# Patient Record
Sex: FEMALE | NOT HISPANIC OR LATINO | Employment: FULL TIME | URBAN - NONMETROPOLITAN AREA
[De-identification: names, ages, dates, MRNs, and addresses within clinical notes are randomized per-mention and may not be internally consistent; named-entity substitution may affect disease eponyms.]

---

## 2017-02-10 ENCOUNTER — HOSPITAL ENCOUNTER (OUTPATIENT)
Dept: LAB | Facility: MEDICAL CENTER | Age: 52
End: 2017-02-10
Attending: NURSE PRACTITIONER
Payer: COMMERCIAL

## 2017-02-10 DIAGNOSIS — E78.2 MIXED HYPERLIPIDEMIA: ICD-10-CM

## 2017-02-10 DIAGNOSIS — I10 ESSENTIAL HYPERTENSION, BENIGN: ICD-10-CM

## 2017-02-10 DIAGNOSIS — R53.83 FATIGUE, UNSPECIFIED TYPE: ICD-10-CM

## 2017-02-10 LAB
25(OH)D3 SERPL-MCNC: 33 NG/ML (ref 30–100)
ALBUMIN SERPL BCP-MCNC: 4.1 G/DL (ref 3.2–4.9)
ALBUMIN/GLOB SERPL: 1.5 G/DL
ALP SERPL-CCNC: 73 U/L (ref 30–99)
ALT SERPL-CCNC: 15 U/L (ref 2–50)
ANION GAP SERPL CALC-SCNC: 5 MMOL/L (ref 0–11.9)
AST SERPL-CCNC: 19 U/L (ref 12–45)
BILIRUB SERPL-MCNC: 0.4 MG/DL (ref 0.1–1.5)
BUN SERPL-MCNC: 22 MG/DL (ref 8–22)
CALCIUM SERPL-MCNC: 9.8 MG/DL (ref 8.5–10.5)
CHLORIDE SERPL-SCNC: 109 MMOL/L (ref 96–112)
CHOLEST SERPL-MCNC: 248 MG/DL (ref 100–199)
CO2 SERPL-SCNC: 28 MMOL/L (ref 20–33)
CREAT SERPL-MCNC: 0.99 MG/DL (ref 0.5–1.4)
ERYTHROCYTE [DISTWIDTH] IN BLOOD BY AUTOMATED COUNT: 44.4 FL (ref 35.9–50)
GLOBULIN SER CALC-MCNC: 2.8 G/DL (ref 1.9–3.5)
GLUCOSE SERPL-MCNC: 102 MG/DL (ref 65–99)
HCT VFR BLD AUTO: 43.5 % (ref 37–47)
HDLC SERPL-MCNC: 61 MG/DL
HGB BLD-MCNC: 14.2 G/DL (ref 12–16)
LDLC SERPL CALC-MCNC: 165 MG/DL
MCH RBC QN AUTO: 30.3 PG (ref 27–33)
MCHC RBC AUTO-ENTMCNC: 32.6 G/DL (ref 33.6–35)
MCV RBC AUTO: 92.9 FL (ref 81.4–97.8)
PLATELET # BLD AUTO: 226 K/UL (ref 164–446)
PMV BLD AUTO: 9.8 FL (ref 9–12.9)
POTASSIUM SERPL-SCNC: 4.7 MMOL/L (ref 3.6–5.5)
PROT SERPL-MCNC: 6.9 G/DL (ref 6–8.2)
RBC # BLD AUTO: 4.68 M/UL (ref 4.2–5.4)
SODIUM SERPL-SCNC: 142 MMOL/L (ref 135–145)
TRIGL SERPL-MCNC: 110 MG/DL (ref 0–149)
WBC # BLD AUTO: 7.2 K/UL (ref 4.8–10.8)

## 2017-02-10 PROCEDURE — 82306 VITAMIN D 25 HYDROXY: CPT

## 2017-02-10 PROCEDURE — 36415 COLL VENOUS BLD VENIPUNCTURE: CPT

## 2017-02-10 PROCEDURE — 80053 COMPREHEN METABOLIC PANEL: CPT

## 2017-02-10 PROCEDURE — 80061 LIPID PANEL: CPT

## 2017-02-10 PROCEDURE — 85027 COMPLETE CBC AUTOMATED: CPT

## 2017-02-15 ENCOUNTER — TELEPHONE (OUTPATIENT)
Dept: MEDICAL GROUP | Facility: PHYSICIAN GROUP | Age: 52
End: 2017-02-15

## 2017-02-15 DIAGNOSIS — E78.2 MIXED HYPERLIPIDEMIA: ICD-10-CM

## 2017-02-15 RX ORDER — SIMVASTATIN 20 MG
20 TABLET ORAL EVERY EVENING
Qty: 30 TAB | Refills: 11 | Status: SHIPPED | OUTPATIENT
Start: 2017-02-15 | End: 2018-03-13 | Stop reason: SDUPTHER

## 2017-02-16 NOTE — TELEPHONE ENCOUNTER
----- Message from LYDIA Martinez sent at 2/13/2017 10:29 AM PST -----  Please call the patient and let her know that she will need to watch her diet a little more closely, recommend limiting red meat, cheese, butter, fried food. Exercising 30+ minutes a day most days of the week. She can read more about this at [employer].org  She needs to take her simvastatin daily, this is on the 4$ list at [employer].

## 2017-02-16 NOTE — TELEPHONE ENCOUNTER
I spoke with pt.  She was advised on note below/.  She said thank you.  She also states that she needs a script for the simvastatin to be sent to [facility].  Also she told me that the blood pressure sent high after cutting back on her metoprolol.  She went back to two pills and she states her bp went normal again.

## 2017-08-09 ENCOUNTER — NON-PROVIDER VISIT (OUTPATIENT)
Dept: OCCUPATIONAL MEDICINE | Facility: CLINIC | Age: 52
End: 2017-08-09
Payer: COMMERCIAL

## 2017-08-09 DIAGNOSIS — Z02.1 DRUG TESTING, PRE-EMPLOYMENT: ICD-10-CM

## 2017-08-09 PROCEDURE — 8898 PR URINE 11 PANEL - SEND TO LAB: Performed by: PREVENTIVE MEDICINE

## 2017-10-14 ENCOUNTER — OFFICE VISIT (OUTPATIENT)
Dept: URGENT CARE | Facility: PHYSICIAN GROUP | Age: 52
End: 2017-10-14
Payer: COMMERCIAL

## 2017-10-14 ENCOUNTER — HOSPITAL ENCOUNTER (OUTPATIENT)
Facility: MEDICAL CENTER | Age: 52
End: 2017-10-14
Attending: FAMILY MEDICINE
Payer: COMMERCIAL

## 2017-10-14 VITALS
WEIGHT: 161 LBS | DIASTOLIC BLOOD PRESSURE: 62 MMHG | SYSTOLIC BLOOD PRESSURE: 116 MMHG | TEMPERATURE: 99.1 F | HEART RATE: 86 BPM | HEIGHT: 62 IN | RESPIRATION RATE: 16 BRPM | BODY MASS INDEX: 29.63 KG/M2 | OXYGEN SATURATION: 98 %

## 2017-10-14 DIAGNOSIS — N10 ACUTE PYELONEPHRITIS: ICD-10-CM

## 2017-10-14 DIAGNOSIS — R53.1 WEAKNESS: ICD-10-CM

## 2017-10-14 DIAGNOSIS — Z87.440 HISTORY OF UTI: ICD-10-CM

## 2017-10-14 LAB
APPEARANCE UR: CLEAR
BILIRUB UR STRIP-MCNC: NORMAL MG/DL
COLOR UR AUTO: NORMAL
GLUCOSE UR STRIP.AUTO-MCNC: NORMAL MG/DL
KETONES UR STRIP.AUTO-MCNC: NORMAL MG/DL
LEUKOCYTE ESTERASE UR QL STRIP.AUTO: NORMAL
NITRITE UR QL STRIP.AUTO: NORMAL
PH UR STRIP.AUTO: 7 [PH] (ref 5–8)
PROT UR QL STRIP: 100 MG/DL
RBC UR QL AUTO: NORMAL
SP GR UR STRIP.AUTO: 1.01
UROBILINOGEN UR STRIP-MCNC: 4 MG/DL

## 2017-10-14 PROCEDURE — 81002 URINALYSIS NONAUTO W/O SCOPE: CPT | Performed by: FAMILY MEDICINE

## 2017-10-14 PROCEDURE — 87086 URINE CULTURE/COLONY COUNT: CPT

## 2017-10-14 PROCEDURE — 99214 OFFICE O/P EST MOD 30 MIN: CPT | Mod: 25 | Performed by: FAMILY MEDICINE

## 2017-10-14 RX ORDER — CIPROFLOXACIN 500 MG/1
TABLET, FILM COATED ORAL
Qty: 14 TAB | Refills: 0 | Status: SHIPPED | OUTPATIENT
Start: 2017-10-14 | End: 2018-03-13

## 2017-10-14 RX ORDER — CEFTRIAXONE 1 G/1
1 INJECTION, POWDER, FOR SOLUTION INTRAMUSCULAR; INTRAVENOUS ONCE
Status: COMPLETED | OUTPATIENT
Start: 2017-10-14 | End: 2017-10-14

## 2017-10-14 RX ADMIN — CEFTRIAXONE 1 G: 1 INJECTION, POWDER, FOR SOLUTION INTRAMUSCULAR; INTRAVENOUS at 14:52

## 2017-10-14 NOTE — PROGRESS NOTES
Chief Complaint:    Chief Complaint   Patient presents with   • Weakness     nausea, seen in ER 10/8/17 Dx with letitia infectiondr dorsey       History of Present Illness:    This is a new problem. She was seen in [place] ER on 10/8/17 and was treated for kidney infection with Ceftriaxone 1 gram and rx'd Bactrim DS BID x 10 days which she has been taking. She reports she had weakness, pain in right back, right flank, and abdomen. She had nausea. These symptoms have improved. She felt much better within 3 days after going to ER, but now she feels she is getting worse. Feeling more weak. Still with nausea, but not severe enough to need medication for it.      Review of Systems:    Constitutional: See HPI.  Eyes: Negative for change in vision, photophobia, pain, redness, and discharge.  ENT: Negative for ear pain, ear discharge, hearing loss, tinnitus, nasal congestion, nosebleeds, and sore throat.    Respiratory: Negative for cough, hemoptysis, sputum production, shortness of breath, wheezing, and stridor.    Cardiovascular: Negative for chest pain, palpitations, orthopnea, claudication, leg swelling, and PND.   Gastrointestinal: See HPI.  Genitourinary: See HPI.  Musculoskeletal: See HPI.  Skin: Negative for rash and itching.   Neurological: Negative for dizziness, tingling, tremors, sensory change, speech change, focal weakness, seizures, loss of consciousness, and headaches.   Endo: Negative for polydipsia.   Heme: Does not bruise/bleed easily.   Psychiatric/Behavioral: No new symptoms.    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     Social History Main Topics   • Smoking status: Current Every Day Smoker     Packs/day: 1.00     Years: 38.00     Types: Cigarettes   • Smokeless tobacco: Never Used   • Alcohol use No   • Drug use: No   • Sexual activity: Yes     Partners: Male     Birth control/ protection: Post-Menopausal     Other Topics Concern   • Exercise No     does walk a great deal at work     Social History Narrative   • No narrative on file       Family History:    Family History   Problem Relation Age of Onset   • Hypertension Father    • Heart Disease Father        Medications:    Current Outpatient Prescriptions on File Prior to Visit   Medication Sig Dispense Refill   • simvastatin (ZOCOR) 20 MG Tab Take 1 Tab by mouth every evening. 30 Tab 11   • escitalopram (LEXAPRO) 10 MG Tab Take 1 Tab by mouth every day. 90 Tab 3   • metoprolol SR (TOPROL XL) 100 MG TABLET SR 24 HR Take 1 Tab by mouth 2 Times a Day. 180 Tab 3   • albuterol 108 (90 BASE) MCG/ACT Aero Soln inhalation aerosol Inhale 2 Puffs by mouth every four hours as needed for Shortness of Breath. 1 Inhaler 1   • aspirin EC (ECOTRIN) 81 MG Tablet Delayed Response Take 81 mg by mouth every day.       No current facility-administered medications on file prior to visit.        Allergies:    No Known Allergies      Vitals:    Vitals:    10/14/17 1409   BP: 116/62   Pulse: 86   Resp: 16   Temp: 37.3 °C (99.1 °F)   SpO2: 98%   Weight: 73 kg (161 lb)   Height: 1.575 m (5' 2.01\")       Physical Exam:    Constitutional: Vital signs reviewed. Appears well-developed and well-nourished. No acute distress.   Eyes: Sclera white, conjunctivae clear. " PERRLA.  ENT: External ears normal. Hearing normal.   Neck: Neck supple.   Cardiovascular: Regular rate and rhythm. No murmur.  Pulmonary/Chest: Respirations non-labored. Clear to auscultation bilaterally.  Abdomen: Bowel sounds are normal active. Soft, non-distended, and non-tender to palpation.  Musculoskeletal: Normal gait. Normal range of motion. No muscular atrophy or weakness.  Neurological: Alert and oriented to person, place, and time. CN 2-12 intact. Muscle tone normal. Coordination normal.   Skin: No rashes or lesions. Warm, dry, normal turgor.  Psychiatric: Normal mood and affect. Behavior is normal. Judgment and thought content normal.     Diagnostics:    POCT URINALYSIS (Order #[id number]) on 10/14/17   Component Results     Component Value Ref Range & Units Status   POC Color narayanan Negative Final   POC Appearance clear Negative Final   POC Leukocyte Esterase sm Negative Final   POC Nitrites neg Negative Final   POC Urobiligen 4 Negative (0.2) mg/dL Final   POC Protein 100 Negative mg/dL Final   POC Urine PH 7.0 5.0 - 8.0 Final   POC Blood mod Negative Final   POC Specific Gravity 1.015 <1.005 - >1.030 Final   POC Ketones neg Negative mg/dL Final   POC Biliruben sm Negative mg/dL Final   POC Glucose neg Negative mg/dL Final   Last Resulted Time   Sat Oct 14, 2017  2:33 PM       Assessment / Plan:    1. Weakness  - POCT Urinalysis  - URINE CULTURE(NEW); Future    2. History of UTI  - POCT Urinalysis  - URINE CULTURE(NEW); Future    3. Acute pyelonephritis  - URINE CULTURE(NEW); Future  - cefTRIAXone (ROCEPHIN) injection 1 g; 1,000 mg by Intramuscular route Once.  - ciprofloxacin (CIPRO) 500 MG Tab; 1 TAB TWICE A DAY X 7 DAYS.  Dispense: 14 Tab; Refill: 0      Work note given - excuse for 10/11 thru and including 10/14/17.    Discussed with her DDX and management options.    I suspect the Ceftriaxone she received in ER on 10/8/17 helped and is the reason she improved for about 3 days after being seen.  However, her weakness has been worsening and U. dip today still shows signs of infection, which if Bactrim DS rx'd by ER was working, I think the U. dip would be mostly clear by now after being on med since 10/8/17. Thus, I suspect the Bactrim DS is not working.    Due to this, I offered another Rocephin IM treatment and switch to other p.o. antibiotic. She is agreeable.    Agreeable to medications given and prescribed and send urine for culture. She will d/c Bactrim DS rx'd by ER on 10/8/17.    Says may call [phone] (M) with urine culture result and OK to leave message with result.    Follow-up with PCP or urgent care if getting worse while waiting for urine culture result.

## 2017-10-16 DIAGNOSIS — N10 ACUTE PYELONEPHRITIS: ICD-10-CM

## 2017-10-16 DIAGNOSIS — Z87.440 HISTORY OF UTI: ICD-10-CM

## 2017-10-16 DIAGNOSIS — R53.1 WEAKNESS: ICD-10-CM

## 2017-10-18 LAB
BACTERIA UR CULT: NORMAL
SIGNIFICANT IND 70042: NORMAL
SOURCE SOURCE: NORMAL

## 2018-02-15 DIAGNOSIS — F32.1 MODERATE SINGLE CURRENT EPISODE OF MAJOR DEPRESSIVE DISORDER (HCC): ICD-10-CM

## 2018-02-15 DIAGNOSIS — I10 ESSENTIAL HYPERTENSION, BENIGN: ICD-10-CM

## 2018-02-15 RX ORDER — ESCITALOPRAM OXALATE 10 MG/1
TABLET ORAL
Refills: 3 | OUTPATIENT
Start: 2018-02-15

## 2018-02-15 RX ORDER — METOPROLOL SUCCINATE 100 MG/1
TABLET, EXTENDED RELEASE ORAL
Refills: 11 | OUTPATIENT
Start: 2018-02-15

## 2018-02-19 DIAGNOSIS — F32.1 MODERATE SINGLE CURRENT EPISODE OF MAJOR DEPRESSIVE DISORDER (HCC): ICD-10-CM

## 2018-02-20 DIAGNOSIS — I10 ESSENTIAL HYPERTENSION, BENIGN: ICD-10-CM

## 2018-02-20 NOTE — TELEPHONE ENCOUNTER
Was the patient seen in the last year in this department? No     Does patient have an active prescription for medications requested? No     Received Request Via: Patient     Ov made for 3/16/201/8  Pt states that she has been out for 2 months she is requesting to have a refill till her appointment

## 2018-02-21 RX ORDER — ESCITALOPRAM OXALATE 10 MG/1
10 TABLET ORAL DAILY
Qty: 90 TAB | Refills: 3 | Status: SHIPPED | OUTPATIENT
Start: 2018-02-21 | End: 2019-06-18 | Stop reason: SDUPTHER

## 2018-02-22 RX ORDER — METOPROLOL SUCCINATE 100 MG/1
TABLET, EXTENDED RELEASE ORAL
Qty: 60 TAB | Refills: 0 | Status: SHIPPED | OUTPATIENT
Start: 2018-02-22 | End: 2018-03-13 | Stop reason: SDUPTHER

## 2018-03-13 ENCOUNTER — OFFICE VISIT (OUTPATIENT)
Dept: MEDICAL GROUP | Facility: PHYSICIAN GROUP | Age: 53
End: 2018-03-13
Payer: COMMERCIAL

## 2018-03-13 VITALS
OXYGEN SATURATION: 97 % | HEIGHT: 62 IN | BODY MASS INDEX: 33.84 KG/M2 | SYSTOLIC BLOOD PRESSURE: 116 MMHG | RESPIRATION RATE: 16 BRPM | HEART RATE: 62 BPM | TEMPERATURE: 98.2 F | DIASTOLIC BLOOD PRESSURE: 62 MMHG | WEIGHT: 183.9 LBS

## 2018-03-13 DIAGNOSIS — E78.2 MIXED HYPERLIPIDEMIA: ICD-10-CM

## 2018-03-13 DIAGNOSIS — Z78.0 POSTMENOPAUSAL: ICD-10-CM

## 2018-03-13 DIAGNOSIS — Z23 NEED FOR VACCINATION: ICD-10-CM

## 2018-03-13 DIAGNOSIS — R51.9 FREQUENT HEADACHES: ICD-10-CM

## 2018-03-13 DIAGNOSIS — F17.200 TOBACCO USE DISORDER: Chronic | ICD-10-CM

## 2018-03-13 DIAGNOSIS — E66.9 OBESITY (BMI 30-39.9): ICD-10-CM

## 2018-03-13 DIAGNOSIS — F32.1 MODERATE SINGLE CURRENT EPISODE OF MAJOR DEPRESSIVE DISORDER (HCC): ICD-10-CM

## 2018-03-13 DIAGNOSIS — Z12.31 ENCOUNTER FOR SCREENING MAMMOGRAM FOR BREAST CANCER: ICD-10-CM

## 2018-03-13 DIAGNOSIS — E55.9 VITAMIN D DEFICIENCY: ICD-10-CM

## 2018-03-13 DIAGNOSIS — Z00.00 HEALTH CARE MAINTENANCE: ICD-10-CM

## 2018-03-13 DIAGNOSIS — I10 ESSENTIAL HYPERTENSION, BENIGN: ICD-10-CM

## 2018-03-13 PROCEDURE — 90715 TDAP VACCINE 7 YRS/> IM: CPT | Performed by: NURSE PRACTITIONER

## 2018-03-13 PROCEDURE — 90471 IMMUNIZATION ADMIN: CPT | Performed by: NURSE PRACTITIONER

## 2018-03-13 PROCEDURE — 99214 OFFICE O/P EST MOD 30 MIN: CPT | Mod: 25 | Performed by: NURSE PRACTITIONER

## 2018-03-13 RX ORDER — METOPROLOL SUCCINATE 100 MG/1
TABLET, EXTENDED RELEASE ORAL
Qty: 60 TAB | Refills: 5 | Status: SHIPPED | OUTPATIENT
Start: 2018-03-13 | End: 2018-12-13 | Stop reason: SDUPTHER

## 2018-03-13 RX ORDER — SIMVASTATIN 20 MG
20 TABLET ORAL EVERY EVENING
Qty: 30 TAB | Refills: 11 | Status: SHIPPED | OUTPATIENT
Start: 2018-03-13 | End: 2018-06-25

## 2018-03-13 NOTE — ASSESSMENT & PLAN NOTE
Patient is a 52-year-old female with hypertension, she continues on metoprolol 200 mg sustained-release daily. Her blood pressure is normal. She denies chest pain, shortness of breath, change of vision, headaches. She is due for CMP. We will have her follow-up in 6 months.

## 2018-03-13 NOTE — ASSESSMENT & PLAN NOTE
Patient's is a 52-year-old female with hyperlipidemia, she continues on simvastatin 20 mg daily, she is due for labs.

## 2018-03-13 NOTE — PROGRESS NOTES
[facility]  Primary Care Office Visit - Problem-Oriented        History:     Bryan Kearney is a 52 y.o. female who is here today to discuss Hypertension (Medication refills of metoprolol)      Health care maintenance  Due for PAP & mammogram - ordered    Essential hypertension, benign  Patient is a 52-year-old female with hypertension, she continues on metoprolol 200 mg sustained-release daily. Her blood pressure is normal. She denies chest pain, shortness of breath, change of vision, headaches. She is due for CMP. We will have her follow-up in 6 months.    Major depression, single episode  Patient is a 52-year-old female with depression here for follow-up. She continues on citalopram 10 mg daily. She has less tearfulness and sadness. She does occasionally have irritability though this is mostly directed towards her . She does admit that if she does not have her ask citalopram she has extreme mood swings. She denies any side effects of this medication. She denies suicidal or homicidal ideations. She will follow up in 6 months.    Mixed hyperlipidemia  Patient's is a 52-year-old female with hyperlipidemia, she continues on simvastatin 20 mg daily, she is due for labs.    Tobacco use disorder  Patient is a daily smoker, she has no intentions of quitting.        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     Social History Main Topics   • Smoking status: Current Every Day Smoker     Packs/day: 1.00     Years: 38.00     Types: Cigarettes   • Smokeless tobacco: Never Used   • Alcohol use No   • Drug use: No   • Sexual activity: Yes     Partners: Male     Birth control/ protection: Post-Menopausal     Other Topics Concern   • Exercise No     does walk a great deal at work     Social History Narrative   • No narrative on file     History   Smoking Status   • Current Every Day Smoker   • Packs/day: 1.00   • Years: 38.00   • Types: Cigarettes   Smokeless Tobacco   • Never Used     Family History   Problem Relation Age of Onset   • Hypertension Father    • Heart Disease Father      No Known Allergies    Problem List:     Patient Active Problem List    Diagnosis Date Noted   • Health care maintenance 03/13/2018   • Obesity (BMI 30-39.9) 03/13/2018   • Postmenopausal 03/13/2018   • Major depression, single episode 10/04/2013   • Essential hypertension, benign 02/26/2013   • Vitamin D deficiency 05/23/2011   • Tobacco use disorder 02/01/2010   • Mixed hyperlipidemia          Medications:     Current Outpatient Prescriptions:   •  metoprolol SR (TOPROL XL) 100 MG TABLET SR 24 HR, TAKE 2 TABLET BY MOUTH ONCE DAILY, Disp: 60 Tab, Rfl: 5  •  simvastatin (ZOCOR) 20 MG Tab, Take 1 Tab by mouth every evening., Disp: 30 Tab, Rfl: 11  •  escitalopram (LEXAPRO) 10 MG Tab, Take 1 Tab by mouth every day., Disp: 90 Tab, Rfl: 3  •  albuterol 108 (90 BASE) MCG/ACT Aero Soln inhalation aerosol, Inhale 2 Puffs by mouth every four hours as needed for Shortness of Breath., Disp: 1 Inhaler, Rfl: 1  •  aspirin EC (ECOTRIN) 81 MG Tablet Delayed Response, Take 81 mg by mouth every day., Disp: , Rfl:       Review of Systems:     Pertinent positives as per HPI, all other systems reviewed and WNL   Physical Assessment:     VS: /62   Pulse 62   Temp 36.8 °C (98.2 °F)   Resp 16   Ht 1.575 m (5' 2.01\")  "  Wt 83.4 kg (183 lb 14.4 oz)   SpO2 97%   Breastfeeding? No   BMI 33.63 kg/m²     General: Well-developed, well-nourished ,female, obese     Head: PERRL, EOMI. Normocephalic. No facial asymmetry noted.  Cardiovasc:RRR, no MRG. No thrills or bruits. Pulses 2+ and symmetric at all distal extremities.  Pulmonary: Lungs clear bilaterally.  Normal respiratory effort. No wheeze or crackles.   Neuro: Alert and oriented  Skin:No rashes noted. Skin warm, dry, intact    Psych: Dressed appropriately for the weather, pleasant and conversant.  Affect, mood & judgment appropriate.    Assessment/Plan:   Bryan was seen today for hypertension.    Diagnoses and all orders for this visit:    Essential hypertension, benign, chronic, stable   - Continue current treatment, monitor CMP, follow up in 6 months, sooner if needed  -     metoprolol SR (TOPROL XL) 100 MG TABLET SR 24 HR; TAKE 2 TABLET BY MOUTH ONCE DAILY  -     COMP METABOLIC PANEL; Future    Moderate single current episode of major depressive disorder (CMS-HCC), chronic, stable  - . Continue Lexapro, follow-up in 6 months, sooner if needed.    Frequent headaches, chronic, stable on present treatment  -     metoprolol SR (TOPROL XL) 100 MG TABLET SR 24 HR; TAKE 2 TABLET BY MOUTH ONCE DAILY    Need for vaccination  -     TDAP VACCINE =>6YO IM    Mixed hyperlipidemia, chronic, unclear control, monitor labs and follow up in 6 months  -     simvastatin (ZOCOR) 20 MG Tab; Take 1 Tab by mouth every evening.  -     LIPID PROFILE; Future    Health care maintenance  - Mammogram ordered, patient to follow-up in the office for Pap smear    Obesity (BMI 30-39.9)  -     Patient identified as having weight management issue.  Appropriate orders and counseling given.    Encounter for screening mammogram for breast cancer  -     MA-SCREEN MAMMO W/CAD-BILAT; Future\    Tobacco use disorder, not ready to quit    Vitamin D deficiency, unclear control, monitor labs  -     VITAMIN D,25  HYDROXY; Future      Patient is agreeable to the above plan and voiced understanding. All questions answered.     Please note that this dictation was created using voice recognition software. I have made every reasonable attempt to correct obvious errors, but I expect that there are errors of grammar and possibly content that I did not discover before finalizing the note.      NICA South  3/13/2018, 3:22 PM

## 2018-03-13 NOTE — ASSESSMENT & PLAN NOTE
Patient is a 52-year-old female with depression here for follow-up. She continues on citalopram 10 mg daily. She has less tearfulness and sadness. She does occasionally have irritability though this is mostly directed towards her . She does admit that if she does not have her ask citalopram she has extreme mood swings. She denies any side effects of this medication. She denies suicidal or homicidal ideations. She will follow up in 6 months.

## 2018-06-23 DIAGNOSIS — E78.2 MIXED HYPERLIPIDEMIA: ICD-10-CM

## 2018-06-25 RX ORDER — SIMVASTATIN 20 MG
TABLET ORAL
Qty: 90 TAB | Refills: 0 | Status: SHIPPED | OUTPATIENT
Start: 2018-06-25 | End: 2019-06-24

## 2018-11-14 ENCOUNTER — HOSPITAL ENCOUNTER (OUTPATIENT)
Facility: MEDICAL CENTER | Age: 53
End: 2018-11-14
Attending: PHYSICIAN ASSISTANT
Payer: COMMERCIAL

## 2018-11-14 ENCOUNTER — OFFICE VISIT (OUTPATIENT)
Dept: URGENT CARE | Facility: PHYSICIAN GROUP | Age: 53
End: 2018-11-14
Payer: COMMERCIAL

## 2018-11-14 VITALS
HEIGHT: 62 IN | WEIGHT: 179 LBS | HEART RATE: 76 BPM | RESPIRATION RATE: 16 BRPM | DIASTOLIC BLOOD PRESSURE: 82 MMHG | OXYGEN SATURATION: 94 % | BODY MASS INDEX: 32.94 KG/M2 | SYSTOLIC BLOOD PRESSURE: 132 MMHG | TEMPERATURE: 96.8 F

## 2018-11-14 DIAGNOSIS — N30.00 ACUTE CYSTITIS WITHOUT HEMATURIA: ICD-10-CM

## 2018-11-14 DIAGNOSIS — N30.00 ACUTE CYSTITIS WITHOUT HEMATURIA: Primary | ICD-10-CM

## 2018-11-14 DIAGNOSIS — R06.2 WHEEZE: ICD-10-CM

## 2018-11-14 DIAGNOSIS — R30.0 DYSURIA: ICD-10-CM

## 2018-11-14 DIAGNOSIS — R10.9 FLANK PAIN: ICD-10-CM

## 2018-11-14 DIAGNOSIS — J00 ACUTE NASOPHARYNGITIS: ICD-10-CM

## 2018-11-14 LAB
APPEARANCE UR: NORMAL
BILIRUB UR STRIP-MCNC: NORMAL MG/DL
COLOR UR AUTO: NORMAL
GLUCOSE UR STRIP.AUTO-MCNC: NORMAL MG/DL
KETONES UR STRIP.AUTO-MCNC: NORMAL MG/DL
LEUKOCYTE ESTERASE UR QL STRIP.AUTO: NORMAL
NITRITE UR QL STRIP.AUTO: NORMAL
PH UR STRIP.AUTO: 5 [PH] (ref 5–8)
PROT UR QL STRIP: NORMAL MG/DL
RBC UR QL AUTO: NORMAL
SP GR UR STRIP.AUTO: 1.01
UROBILINOGEN UR STRIP-MCNC: NORMAL MG/DL

## 2018-11-14 PROCEDURE — 94640 AIRWAY INHALATION TREATMENT: CPT | Performed by: PHYSICIAN ASSISTANT

## 2018-11-14 PROCEDURE — 99214 OFFICE O/P EST MOD 30 MIN: CPT | Mod: 25 | Performed by: PHYSICIAN ASSISTANT

## 2018-11-14 PROCEDURE — 81002 URINALYSIS NONAUTO W/O SCOPE: CPT | Performed by: PHYSICIAN ASSISTANT

## 2018-11-14 PROCEDURE — 87086 URINE CULTURE/COLONY COUNT: CPT

## 2018-11-14 RX ORDER — ALBUTEROL SULFATE 90 UG/1
2 AEROSOL, METERED RESPIRATORY (INHALATION) EVERY 4 HOURS PRN
Qty: 1 INHALER | Refills: 0 | Status: SHIPPED | OUTPATIENT
Start: 2018-11-14 | End: 2019-07-05

## 2018-11-14 RX ORDER — PHENAZOPYRIDINE HYDROCHLORIDE 200 MG/1
200 TABLET, FILM COATED ORAL 3 TIMES DAILY
Qty: 6 TAB | Refills: 0 | Status: SHIPPED | OUTPATIENT
Start: 2018-11-14 | End: 2018-11-16

## 2018-11-14 RX ORDER — NITROFURANTOIN 25; 75 MG/1; MG/1
100 CAPSULE ORAL EVERY 12 HOURS
Qty: 10 CAP | Refills: 0 | Status: SHIPPED | OUTPATIENT
Start: 2018-11-14 | End: 2018-11-19

## 2018-11-14 RX ORDER — ALBUTEROL SULFATE 2.5 MG/3ML
2.5 SOLUTION RESPIRATORY (INHALATION) ONCE
Status: COMPLETED | OUTPATIENT
Start: 2018-11-14 | End: 2018-11-14

## 2018-11-14 RX ADMIN — ALBUTEROL SULFATE 2.5 MG: 2.5 SOLUTION RESPIRATORY (INHALATION) at 15:22

## 2018-11-14 NOTE — PROGRESS NOTES
Chief Complaint   Patient presents with   • Cough       HISTORY OF PRESENT ILLNESS: Patient is a 53 y.o. female who presents today because she has 2 complaints.    1.  She has a 3-5-day history of nasal congestion, tight cough and a sore throat.  She has not been taking any medications for her symptoms.    2.  She has a 3-5-day history of increased urinary urgency and frequency and some right flank pain    Patient Active Problem List    Diagnosis Date Noted   • Health care maintenance 03/13/2018   • Obesity (BMI 30-39.9) 03/13/2018   • Postmenopausal 03/13/2018   • Major depression, single episode 10/04/2013   • Essential hypertension, benign 02/26/2013   • Vitamin D deficiency 05/23/2011   • Tobacco use disorder 02/01/2010   • Mixed hyperlipidemia        Allergies:Patient has no known allergies.    Current Outpatient Prescriptions Ordered in [facility]   Medication Sig Dispense Refill   • nitrofurantoin monohydr macro (MACROBID) 100 MG Cap Take 1 Cap by mouth every 12 hours for 5 days. 10 Cap 0   • phenazopyridine (PYRIDIUM) 200 MG Tab Take 1 Tab by mouth 3 times a day for 2 days. 6 Tab 0   • albuterol 108 (90 Base) MCG/ACT Aero Soln inhalation aerosol Inhale 2 Puffs by mouth every four hours as needed. 1 Inhaler 0   • simvastatin (ZOCOR) 20 MG Tab TAKE ONE TABLET BY MOUTH IN THE EVENING 90 Tab 0   • metoprolol SR (TOPROL XL) 100 MG TABLET SR 24 HR TAKE 2 TABLET BY MOUTH ONCE DAILY 60 Tab 5   • escitalopram (LEXAPRO) 10 MG Tab Take 1 Tab by mouth every day. 90 Tab 3   • albuterol 108 (90 BASE) MCG/ACT Aero Soln inhalation aerosol Inhale 2 Puffs by mouth every four hours as needed for Shortness of Breath. 1 Inhaler 1   • aspirin EC (ECOTRIN) 81 MG Tablet Delayed Response Take 81 mg by mouth every day.       Current Facility-Administered Medications Ordered in Epic   Medication Dose Route Frequency Provider Last Rate Last Dose   • albuterol (PROVENTIL) 2.5mg/3ml nebulizer solution 2.5 mg  2.5 mg Nebulization Sienna MCCLELLAN  "SG Ramos           Past Medical History:   Diagnosis Date   • Bone island 2011   • Bone island 2011   • Elevated BP 10/5/2009   • Essential hypertension, benign 2013   • Major depression, single episode 10/4/2013   • Mixed hyperlipidemia 10/5/09    uncontrolled   • Premature menopause 10/5/2009   • Tobacco use disorder 2010   • Unspecified vitamin D deficiency 2011       Social History   Substance Use Topics   • Smoking status: Current Every Day Smoker     Packs/day: 1.00     Years: 38.00     Types: Cigarettes   • Smokeless tobacco: Never Used   • Alcohol use No       Family Status   Relation Status   • Mo         hemorrhagic bleed due to anuerysm   • Fa         myocardial infarction     Family History   Problem Relation Age of Onset   • Hypertension Father    • Heart Disease Father        ROS:  Review of Systems   Constitutional: Negative for fever, chills, weight loss and malaise/fatigue.   HENT: Negative for ear pain, nosebleeds, positive for nasal congestion, no sore throat and neck pain.    Eyes: Negative for blurred vision.   Respiratory: Positive for mild cough, no sputum production, shortness of breath and wheezing.    Cardiovascular: Negative for chest pain, palpitations, orthopnea and leg swelling.   Gastrointestinal: Negative for heartburn, nausea, vomiting and abdominal pain.  Positive for right flank  Genitourinary: Negative for dysuria, positive for urgency and frequency.     Exam:  Blood pressure 132/82, pulse 76, temperature 36 °C (96.8 °F), temperature source Temporal, resp. rate 16, height 1.575 m (5' 2\"), weight 81.2 kg (179 lb), SpO2 94 %, not currently breastfeeding.  General:  Well nourished, well developed female in NAD  Head:Normocephalic, atraumatic  Eyes: PERRLA, EOM within normal limits, no conjunctival injection, no scleral icterus, visual fields and acuity grossly intact.  Ears: Normal shape and symmetry, no tenderness, no discharge. " External canals are without any significant edema or erythema. Tympanic membranes are without any inflammation, no effusion. Gross auditory acuity is intact  Nose: Symmetrical without tenderness, no discharge.  Nasal mucosa is mildly edematous bilaterally  Mouth: reasonable hygiene, no erythema exudates or tonsillar enlargement.  Neck: no masses, range of motion within normal limits, no tracheal deviation. No obvious thyroid enlargement.  Pulmonary: chest is symmetrical with respiration, rare wheeze, no rales or rhonchi.  After nebulization of albuterol in the office, the patient stated significant subjective improvement, there is increased air exchange bilaterally, no wheezes, rales or rhonchi.    Cardiovascular: regular rate and rhythm without murmurs, rubs, or gallops.  Extremities: no clubbing, cyanosis, or edema.    Please note that this dictation was created using voice recognition software. I have made every reasonable attempt to correct obvious errors, but I expect that there are errors of grammar and possibly content that I did not discover before finalizing the note.    Assessment/Plan:  1. Acute cystitis without hematuria  Urine Culture    nitrofurantoin monohydr macro (MACROBID) 100 MG Cap   2. Wheeze  albuterol (PROVENTIL) 2.5mg/3ml nebulizer solution 2.5 mg    albuterol 108 (90 Base) MCG/ACT Aero Soln inhalation aerosol   3. Flank pain  POCT Urinalysis   4. Dysuria  phenazopyridine (PYRIDIUM) 200 MG Tab   5. Acute nasopharyngitis         Followup with primary care in the next 7-10 days if not significantly improving, return to the urgent care or go to the emergency room sooner for any worsening of symptoms.

## 2018-11-14 NOTE — PATIENT INSTRUCTIONS
"Smoking Cessation, Tips for Success  If you are ready to quit smoking, congratulations! You have chosen to help yourself be healthier. Cigarettes bring nicotine, tar, carbon monoxide, and other irritants into your body. Your lungs, heart, and blood vessels will be able to work better without these poisons. There are many different ways to quit smoking. Nicotine gum, nicotine patches, a nicotine inhaler, or nicotine nasal spray can help with physical craving. Hypnosis, support groups, and medicines help break the habit of smoking.  WHAT THINGS CAN I DO TO MAKE QUITTING EASIER?   Here are some tips to help you quit for good:  · Pick a date when you will quit smoking completely. Tell all of your friends and family about your plan to quit on that date.  · Do not try to slowly cut down on the number of cigarettes you are smoking. Pick a quit date and quit smoking completely starting on that day.  · Throw away all cigarettes.    · Clean and remove all ashtrays from your home, work, and car.  · On a card, write down your reasons for quitting. Carry the card with you and read it when you get the urge to smoke.  · Cleanse your body of nicotine. Drink enough water and fluids to keep your urine clear or pale yellow. Do this after quitting to flush the nicotine from your body.  · Learn to predict your moods. Do not let a bad situation be your excuse to have a cigarette. Some situations in your life might tempt you into wanting a cigarette.  · Never have \"just one\" cigarette. It leads to wanting another and another. Remind yourself of your decision to quit.  · Change habits associated with smoking. If you smoked while driving or when feeling stressed, try other activities to replace smoking. Stand up when drinking your coffee. Brush your teeth after eating. Sit in a different chair when you read the paper. Avoid alcohol while trying to quit, and try to drink fewer caffeinated beverages. Alcohol and caffeine may urge you to " "smoke.  · Avoid foods and drinks that can trigger a desire to smoke, such as sugary or spicy foods and alcohol.  · Ask people who smoke not to smoke around you.  · Have something planned to do right after eating or having a cup of coffee. For example, plan to take a walk or exercise.  · Try a relaxation exercise to calm you down and decrease your stress. Remember, you may be tense and nervous for the first 2 weeks after you quit, but this will pass.  · Find new activities to keep your hands busy. Play with a pen, coin, or rubber band. Doodle or draw things on paper.  · Brush your teeth right after eating. This will help cut down on the craving for the taste of tobacco after meals. You can also try mouthwash.    · Use oral substitutes in place of cigarettes. Try using lemon drops, carrots, cinnamon sticks, or chewing gum. Keep them handy so they are available when you have the urge to smoke.  · When you have the urge to smoke, try deep breathing.  · Designate your home as a nonsmoking area.  · If you are a heavy smoker, ask your health care provider about a prescription for nicotine chewing gum. It can ease your withdrawal from nicotine.  · Reward yourself. Set aside the cigarette money you save and buy yourself something nice.  · Look for support from others. Join a support group or smoking cessation program. Ask someone at home or at work to help you with your plan to quit smoking.  · Always ask yourself, \"Do I need this cigarette or is this just a reflex?\" Tell yourself, \"Today, I choose not to smoke,\" or \"I do not want to smoke.\" You are reminding yourself of your decision to quit.  · Do not replace cigarette smoking with electronic cigarettes (commonly called e-cigarettes). The safety of e-cigarettes is unknown, and some may contain harmful chemicals.  · If you relapse, do not give up! Plan ahead and think about what you will do the next time you get the urge to smoke.  HOW WILL I FEEL WHEN I QUIT SMOKING?  You " may have symptoms of withdrawal because your body is used to nicotine (the addictive substance in cigarettes). You may crave cigarettes, be irritable, feel very hungry, cough often, get headaches, or have difficulty concentrating. The withdrawal symptoms are only temporary. They are strongest when you first quit but will go away within 10-14 days. When withdrawal symptoms occur, stay in control. Think about your reasons for quitting. Remind yourself that these are signs that your body is healing and getting used to being without cigarettes. Remember that withdrawal symptoms are easier to treat than the major diseases that smoking can cause.   Even after the withdrawal is over, expect periodic urges to smoke. However, these cravings are generally short lived and will go away whether you smoke or not. Do not smoke!  WHAT RESOURCES ARE AVAILABLE TO HELP ME QUIT SMOKING?  Your health care provider can direct you to community resources or hospitals for support, which may include:  · Group support.  · Education.  · Hypnosis.  · Therapy.     This information is not intended to replace advice given to you by your health care provider. Make sure you discuss any questions you have with your health care provider.     Document Released: 09/15/2005 Document Revised: 01/08/2016 Document Reviewed: 06/05/2014  [employer] Interactive Patient Education ©2016 [employer]

## 2018-11-17 LAB
BACTERIA UR CULT: NORMAL
SIGNIFICANT IND 70042: NORMAL
SITE SITE: NORMAL
SOURCE SOURCE: NORMAL

## 2018-12-13 DIAGNOSIS — R51.9 FREQUENT HEADACHES: ICD-10-CM

## 2018-12-13 DIAGNOSIS — I10 ESSENTIAL HYPERTENSION, BENIGN: ICD-10-CM

## 2018-12-14 NOTE — TELEPHONE ENCOUNTER
Was the patient seen in the last year in this department? Yes    Does patient have an active prescription for medications requested? No     Received Request Via: Pharmacy      Pt met protocol?: Yes    OV 3/18     BP Readings from Last 1 Encounters:   11/14/18 132/82

## 2018-12-15 RX ORDER — METOPROLOL SUCCINATE 100 MG/1
TABLET, EXTENDED RELEASE ORAL
Qty: 180 TAB | Refills: 0 | Status: SHIPPED | OUTPATIENT
Start: 2018-12-15 | End: 2019-04-05 | Stop reason: SDUPTHER

## 2019-03-29 ENCOUNTER — OFFICE VISIT (OUTPATIENT)
Dept: URGENT CARE | Facility: PHYSICIAN GROUP | Age: 54
End: 2019-03-29
Payer: COMMERCIAL

## 2019-03-29 VITALS
WEIGHT: 179 LBS | BODY MASS INDEX: 32.94 KG/M2 | RESPIRATION RATE: 16 BRPM | HEIGHT: 62 IN | SYSTOLIC BLOOD PRESSURE: 120 MMHG | DIASTOLIC BLOOD PRESSURE: 84 MMHG | TEMPERATURE: 98.4 F | HEART RATE: 73 BPM | OXYGEN SATURATION: 94 %

## 2019-03-29 DIAGNOSIS — J22 LRTI (LOWER RESPIRATORY TRACT INFECTION): ICD-10-CM

## 2019-03-29 DIAGNOSIS — R05.9 COUGH: ICD-10-CM

## 2019-03-29 PROCEDURE — 99214 OFFICE O/P EST MOD 30 MIN: CPT | Performed by: NURSE PRACTITIONER

## 2019-03-29 RX ORDER — BENZONATATE 100 MG/1
100 CAPSULE ORAL 3 TIMES DAILY PRN
Qty: 60 CAP | Refills: 0 | Status: SHIPPED | OUTPATIENT
Start: 2019-03-29 | End: 2019-07-05

## 2019-03-29 RX ORDER — DOXYCYCLINE HYCLATE 100 MG
100 TABLET ORAL 2 TIMES DAILY
Qty: 14 TAB | Refills: 0 | Status: SHIPPED | OUTPATIENT
Start: 2019-03-29 | End: 2019-04-05

## 2019-03-29 RX ORDER — PREDNISONE 10 MG/1
40 TABLET ORAL DAILY
Qty: 20 TAB | Refills: 0 | Status: SHIPPED | OUTPATIENT
Start: 2019-03-29 | End: 2019-04-03

## 2019-03-29 ASSESSMENT — ENCOUNTER SYMPTOMS
SHORTNESS OF BREATH: 1
RHINORRHEA: 1
CHILLS: 1
FEVER: 1
EYE PAIN: 0
NAUSEA: 0
SORE THROAT: 0
COUGH: 1
MYALGIAS: 1
VOMITING: 0
SPUTUM PRODUCTION: 1
DIZZINESS: 0

## 2019-03-29 ASSESSMENT — COPD QUESTIONNAIRES: COPD: 0

## 2019-03-29 NOTE — LETTER
March 29, 2019         Patient: Bryan Kearney   YOB: 1965   Date of Visit: 3/29/2019           To Whom it May Concern:    Bryan Kearney was seen in my clinic on 3/29/2019. She may return to work on 3/30/19.    If you have any questions or concerns, please don't hesitate to call.        Sincerely,           BRIDGETTE Chavarria.  Electronically Signed

## 2019-03-30 NOTE — PROGRESS NOTES
"Subjective:   Bryan Kearney is a 53 y.o. female who presents for Cough (wet, productive cough, chest congestion, shortness of breath x 1 day)        Cough   This is a new problem. Episode onset: 2 days. The problem has been gradually worsening. The problem occurs constantly. The cough is productive of sputum. Associated symptoms include chills, a fever, myalgias, nasal congestion, rhinorrhea and shortness of breath. Pertinent negatives include no chest pain, rash or sore throat. Nothing aggravates the symptoms. Risk factors for lung disease include smoking/tobacco exposure. She has tried nothing for the symptoms. The treatment provided no relief. There is no history of bronchitis or COPD.     Review of Systems   Constitutional: Positive for chills, fever and malaise/fatigue.   HENT: Positive for rhinorrhea. Negative for sore throat.    Eyes: Negative for pain.   Respiratory: Positive for cough, sputum production and shortness of breath.    Cardiovascular: Negative for chest pain.   Gastrointestinal: Negative for nausea and vomiting.   Genitourinary: Negative for hematuria.   Musculoskeletal: Positive for myalgias.   Skin: Negative for rash.   Neurological: Negative for dizziness.     No Known Allergies   Objective:   /84   Pulse 73   Temp 36.9 °C (98.4 °F) (Temporal)   Resp 16   Ht 1.575 m (5' 2\")   Wt 81.2 kg (179 lb)   SpO2 94%   BMI 32.74 kg/m²   Physical Exam   Constitutional: She is oriented to person, place, and time. She appears well-developed and well-nourished. No distress.   HENT:   Head: Normocephalic and atraumatic.   Right Ear: Tympanic membrane normal.   Left Ear: Tympanic membrane normal.   Nose: Nose normal. Right sinus exhibits no maxillary sinus tenderness and no frontal sinus tenderness. Left sinus exhibits no maxillary sinus tenderness and no frontal sinus tenderness.   Mouth/Throat: Uvula is midline, oropharynx is clear and moist and mucous membranes are normal. No posterior " oropharyngeal edema, posterior oropharyngeal erythema or tonsillar abscesses. No tonsillar exudate.   Eyes: Pupils are equal, round, and reactive to light. Conjunctivae and EOM are normal. Right eye exhibits no discharge. Left eye exhibits no discharge.   Cardiovascular: Normal rate and regular rhythm.    No murmur heard.  Pulmonary/Chest: Effort normal. No respiratory distress. She has no decreased breath sounds. She has wheezes. She has rhonchi. She has no rales.   Abdominal: Soft. She exhibits no distension. There is no tenderness.   Neurological: She is alert and oriented to person, place, and time. She has normal reflexes. No sensory deficit.   Skin: Skin is warm, dry and intact.   Psychiatric: She has a normal mood and affect.         Assessment/Plan:   1. LRTI (lower respiratory tract infection)  - predniSONE (DELTASONE) 10 MG Tab; Take 4 Tabs by mouth every day for 5 days.  Dispense: 20 Tab; Refill: 0  - doxycycline (VIBRAMYCIN) 100 MG Tab; Take 1 Tab by mouth 2 times a day for 7 days.  Dispense: 14 Tab; Refill: 0    2. Cough  - benzonatate (TESSALON) 100 MG Cap; Take 1 Cap by mouth 3 times a day as needed for Cough.  Dispense: 60 Cap; Refill: 0  Advised to continue supportive care with Tylenol and/or ibuprofen for fevers and discomfort. Increased fluids and electrolytes.  Contingent antibiotic prescription given to patient to fill upon meeting criteria of guidelines discussed.   Differential diagnosis, natural history, supportive care, and indications for immediate follow-up discussed.

## 2019-04-05 ENCOUNTER — TELEPHONE (OUTPATIENT)
Dept: MEDICAL GROUP | Facility: PHYSICIAN GROUP | Age: 54
End: 2019-04-05

## 2019-04-05 DIAGNOSIS — E78.2 MIXED HYPERLIPIDEMIA: ICD-10-CM

## 2019-04-05 DIAGNOSIS — I10 ESSENTIAL HYPERTENSION, BENIGN: ICD-10-CM

## 2019-04-05 DIAGNOSIS — R51.9 FREQUENT HEADACHES: ICD-10-CM

## 2019-04-05 NOTE — TELEPHONE ENCOUNTER
Was the patient seen in the last year in this department? No     Does patient have an active prescription for medications requested? No     Received Request Via: Pharmacy      Pt met protocol?: YES    OV 3/18     BP Readings from Last 1 Encounters:   03/29/19 120/84

## 2019-04-09 RX ORDER — METOPROLOL SUCCINATE 100 MG/1
TABLET, EXTENDED RELEASE ORAL
Qty: 180 TAB | Refills: 0 | Status: SHIPPED | OUTPATIENT
Start: 2019-04-09 | End: 2019-07-30 | Stop reason: SDUPTHER

## 2019-04-17 ENCOUNTER — OFFICE VISIT (OUTPATIENT)
Dept: URGENT CARE | Facility: PHYSICIAN GROUP | Age: 54
End: 2019-04-17
Payer: COMMERCIAL

## 2019-04-17 VITALS
WEIGHT: 186 LBS | HEART RATE: 64 BPM | SYSTOLIC BLOOD PRESSURE: 118 MMHG | RESPIRATION RATE: 16 BRPM | BODY MASS INDEX: 34.23 KG/M2 | HEIGHT: 62 IN | OXYGEN SATURATION: 96 % | TEMPERATURE: 97.3 F | DIASTOLIC BLOOD PRESSURE: 82 MMHG

## 2019-04-17 DIAGNOSIS — J22 LRTI (LOWER RESPIRATORY TRACT INFECTION): ICD-10-CM

## 2019-04-17 PROCEDURE — 99214 OFFICE O/P EST MOD 30 MIN: CPT | Performed by: PHYSICIAN ASSISTANT

## 2019-04-17 RX ORDER — AZITHROMYCIN 250 MG/1
TABLET, FILM COATED ORAL
Qty: 6 TAB | Refills: 0 | Status: SHIPPED | OUTPATIENT
Start: 2019-04-17 | End: 2019-07-05

## 2019-04-17 RX ORDER — METHYLPREDNISOLONE 4 MG/1
4 TABLET ORAL SEE ADMIN INSTRUCTIONS
Qty: 21 TAB | Refills: 0 | Status: SHIPPED | OUTPATIENT
Start: 2019-04-17 | End: 2019-07-05

## 2019-04-17 NOTE — PROGRESS NOTES
Chief Complaint   Patient presents with   • Cough       HISTORY OF PRESENT ILLNESS: Patient is a 53 y.o. female who presents today because she has wheezing and coughing and shortness of breath and phlegm production.  She has been vaping instead of smoking.  She was seen about 3 weeks ago, put on doxycycline and a steroid pack and seem to be improving to some degree but her symptoms recurred after finishing the antibiotic course.  She also uses her albuterol inhaler 2-3 times daily.    Patient Active Problem List    Diagnosis Date Noted   • Health care maintenance 03/13/2018   • Obesity (BMI 30-39.9) 03/13/2018   • Postmenopausal 03/13/2018   • Major depression, single episode 10/04/2013   • Essential hypertension, benign 02/26/2013   • Vitamin D deficiency 05/23/2011   • Tobacco use disorder 02/01/2010   • Mixed hyperlipidemia        Allergies:Patient has no known allergies.    Current Outpatient Prescriptions Ordered in [facility]   Medication Sig Dispense Refill   • MethylPREDNISolone (MEDROL DOSEPAK) 4 MG Tablet Therapy Pack Take 1 Tab by mouth See Admin Instructions. 21 Tab 0   • azithromycin (ZITHROMAX) 250 MG Tab Follow package directions 6 Tab 0   • metoprolol SR (TOPROL XL) 100 MG TABLET SR 24 HR TAKE 2 TABLETS BY MOUTH ONCE DAILY 180 Tab 0   • benzonatate (TESSALON) 100 MG Cap Take 1 Cap by mouth 3 times a day as needed for Cough. 60 Cap 0   • albuterol 108 (90 Base) MCG/ACT Aero Soln inhalation aerosol Inhale 2 Puffs by mouth every four hours as needed. 1 Inhaler 0   • simvastatin (ZOCOR) 20 MG Tab TAKE ONE TABLET BY MOUTH IN THE EVENING 90 Tab 0   • escitalopram (LEXAPRO) 10 MG Tab Take 1 Tab by mouth every day. 90 Tab 3   • albuterol 108 (90 BASE) MCG/ACT Aero Soln inhalation aerosol Inhale 2 Puffs by mouth every four hours as needed for Shortness of Breath. 1 Inhaler 1   • aspirin EC (ECOTRIN) 81 MG Tablet Delayed Response Take 81 mg by mouth every day.       No current [facility]-ordered facility-administered  "medications on file.        Past Medical History:   Diagnosis Date   • Bone island 2011   • Bone island 2011   • Elevated BP 10/5/2009   • Essential hypertension, benign 2013   • Major depression, single episode 10/4/2013   • Mixed hyperlipidemia 10/5/09    uncontrolled   • Premature menopause 10/5/2009   • Tobacco use disorder 2010   • Unspecified vitamin D deficiency 2011       Social History   Substance Use Topics   • Smoking status: Current Every Day Smoker     Packs/day: 1.00     Years: 38.00     Types: Cigarettes   • Smokeless tobacco: Never Used   • Alcohol use No       Family Status   Relation Status   • Mo         hemorrhagic bleed due to anuerysm   • Fa         myocardial infarction     Family History   Problem Relation Age of Onset   • Hypertension Father    • Heart Disease Father        ROS:  Review of Systems   Constitutional: Negative for fever, chills, weight loss and malaise/fatigue.   HENT: Negative for ear pain, nosebleeds, congestion, sore throat and neck pain.    Eyes: Negative for blurred vision.   Respiratory: Positive for cough, sputum production, shortness of breath and wheezing.    Cardiovascular: Negative for chest pain, palpitations, orthopnea and leg swelling.   Gastrointestinal: Negative for heartburn, nausea, vomiting and abdominal pain.   Genitourinary: Negative for dysuria, urgency and frequency.     Exam:  /82 (BP Location: Right arm, Patient Position: Sitting, BP Cuff Size: Large adult)   Pulse 64   Temp 36.3 °C (97.3 °F) (Temporal)   Resp 16   Ht 1.575 m (5' 2\")   Wt 84.4 kg (186 lb)   SpO2 96%   General:  Well nourished, well developed female in NAD  Head:Normocephalic, atraumatic  Eyes: PERRLA, EOM within normal limits, no conjunctival injection, no scleral icterus, visual fields and acuity grossly intact.  Ears: Normal shape and symmetry, no tenderness, no discharge. External canals are without any significant edema or " erythema. Tympanic membranes are without any inflammation, no effusion. Gross auditory acuity is intact  Nose: Symmetrical without tenderness, no discharge.  Mouth: reasonable hygiene, no erythema exudates or tonsillar enlargement.  Neck: no masses, range of motion within normal limits, no tracheal deviation. No obvious thyroid enlargement.  Pulmonary: chest is symmetrical with respiration, wheezes and rhonchi bilaterally, not clearing with cough   cardiovascular: regular rate and rhythm without murmurs, rubs, or gallops.  Extremities: no clubbing, cyanosis, or edema.    Please note that this dictation was created using voice recognition software. I have made every reasonable attempt to correct obvious errors, but I expect that there are errors of grammar and possibly content that I did not discover before finalizing the note.    Assessment/Plan:  1. LRTI (lower respiratory tract infection)  MethylPREDNISolone (MEDROL DOSEPAK) 4 MG Tablet Therapy Pack    azithromycin (ZITHROMAX) 250 MG Tab       Followup with primary care in the next 7-10 days if not significantly improving, return to the urgent care or go to the emergency room sooner for any worsening of symptoms.

## 2019-05-24 ENCOUNTER — OFFICE VISIT (OUTPATIENT)
Dept: URGENT CARE | Facility: PHYSICIAN GROUP | Age: 54
End: 2019-05-24
Payer: COMMERCIAL

## 2019-05-24 VITALS
BODY MASS INDEX: 34.23 KG/M2 | TEMPERATURE: 99.6 F | DIASTOLIC BLOOD PRESSURE: 86 MMHG | OXYGEN SATURATION: 92 % | RESPIRATION RATE: 16 BRPM | HEIGHT: 62 IN | HEART RATE: 88 BPM | SYSTOLIC BLOOD PRESSURE: 124 MMHG | WEIGHT: 186 LBS

## 2019-05-24 DIAGNOSIS — J06.9 VIRAL URI: ICD-10-CM

## 2019-05-24 DIAGNOSIS — J02.9 SORE THROAT: ICD-10-CM

## 2019-05-24 LAB
INT CON NEG: NEGATIVE
INT CON POS: POSITIVE
S PYO AG THROAT QL: NEGATIVE

## 2019-05-24 PROCEDURE — 87880 STREP A ASSAY W/OPTIC: CPT | Performed by: PHYSICIAN ASSISTANT

## 2019-05-24 PROCEDURE — 99214 OFFICE O/P EST MOD 30 MIN: CPT | Performed by: PHYSICIAN ASSISTANT

## 2019-05-24 RX ORDER — FLUTICASONE PROPIONATE 50 MCG
1 SPRAY, SUSPENSION (ML) NASAL DAILY
Qty: 16 G | Refills: 0 | Status: SHIPPED
Start: 2019-05-24 | End: 2020-03-21

## 2019-05-24 RX ORDER — GUAIFENESIN 600 MG/1
600 TABLET, EXTENDED RELEASE ORAL EVERY 12 HOURS
Qty: 28 TAB | Refills: 0 | Status: SHIPPED | OUTPATIENT
Start: 2019-05-24 | End: 2021-02-19

## 2019-05-24 RX ORDER — DIPHENHYDRAMINE HYDROCHLORIDE AND LIDOCAINE HYDROCHLORIDE AND ALUMINUM HYDROXIDE AND MAGNESIUM HYDRO
5 KIT EVERY 6 HOURS PRN
Qty: 100 ML | Refills: 0 | Status: SHIPPED | OUTPATIENT
Start: 2019-05-24 | End: 2019-07-05

## 2019-05-24 ASSESSMENT — ENCOUNTER SYMPTOMS
HEADACHES: 1
COUGH: 1
VOMITING: 0
SHORTNESS OF BREATH: 0

## 2019-05-24 NOTE — LETTER
May 24, 2019    To Whom It May Concern:         This is confirmation that Bryan Caio attended her scheduled appointment with Vasquez Juarez P.A.-C. on 5/24/19. Please excuse her from work today.         If you have any questions please do not hesitate to call me at the phone number listed below.    Sincerely,          Vasquez Juarez P.A.-C.  [phone]

## 2019-05-25 NOTE — PROGRESS NOTES
Subjective:   Bryan Kearney is a 53 y.o. female who presents for URI        Pharyngitis    This is a new problem. The current episode started yesterday. The problem has been gradually worsening. Neither side of throat is experiencing more pain than the other. There has been no fever. Associated symptoms include congestion, coughing, headaches and a plugged ear sensation. Pertinent negatives include no shortness of breath or vomiting. She has had no exposure to strep or mono. She has tried cool liquids for the symptoms. The treatment provided mild relief.     Review of Systems   HENT: Positive for congestion.    Respiratory: Positive for cough. Negative for shortness of breath.    Gastrointestinal: Negative for vomiting.   Neurological: Positive for headaches.       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  MEDS:   Current Outpatient Prescriptions:   •  guaiFENesin LA (MUCINEX) 600 MG TABLET SR 12 HR, Take 1 Tab by mouth every 12 hours., Disp: 28 Tab, Rfl: 0  •  fluticasone (FLONASE) 50 MCG/ACT nasal spray, Spray 1 Spray in nose every day., Disp: 16 g, Rfl: 0  •  DPH-Lido-AlHydr-MgHydr-Simeth (MAGIC MOUTHWASH BLM) Suspension, Take 5 mL by mouth every 6 hours as needed., Disp: 100 mL, Rfl: 0  •  MethylPREDNISolone (MEDROL DOSEPAK) 4 MG Tablet Therapy Pack, Take 1 Tab by mouth See Admin Instructions., Disp: 21 Tab, Rfl: 0  •  azithromycin (ZITHROMAX) 250 MG Tab, Follow package directions, Disp: 6 Tab, Rfl: 0  •  metoprolol SR (TOPROL XL) 100 MG TABLET SR 24 HR, TAKE 2 TABLETS BY MOUTH ONCE DAILY, Disp: 180 Tab, Rfl: 0  •  benzonatate (TESSALON) 100 MG Cap, Take 1 Cap by mouth 3 times a day as needed for Cough.,  "Disp: 60 Cap, Rfl: 0  •  albuterol 108 (90 Base) MCG/ACT Aero Soln inhalation aerosol, Inhale 2 Puffs by mouth every four hours as needed., Disp: 1 Inhaler, Rfl: 0  •  simvastatin (ZOCOR) 20 MG Tab, TAKE ONE TABLET BY MOUTH IN THE EVENING, Disp: 90 Tab, Rfl: 0  •  escitalopram (LEXAPRO) 10 MG Tab, Take 1 Tab by mouth every day., Disp: 90 Tab, Rfl: 3  •  albuterol 108 (90 BASE) MCG/ACT Aero Soln inhalation aerosol, Inhale 2 Puffs by mouth every four hours as needed for Shortness of Breath., Disp: 1 Inhaler, Rfl: 1  •  aspirin EC (ECOTRIN) 81 MG Tablet Delayed Response, Take 81 mg by mouth every day., Disp: , Rfl:   ALLERGIES: No Known Allergies  SURGHX:   Past Surgical History:   Procedure Laterality Date   • SALPINGECTOMY  1999    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  She has a 38.00 pack-year smoking history. She has never used smokeless tobacco. She reports that she does not drink alcohol or use drugs.  FH: Family history was reviewed, no pertinent findings to report   Objective:   /86 (BP Location: Right arm, Patient Position: Sitting, BP Cuff Size: Large adult)   Pulse 88   Temp 37.6 °C (99.6 °F) (Temporal)   Resp 16   Ht 1.575 m (5' 2\")   Wt 84.4 kg (186 lb)   SpO2 92%   BMI 34.02 kg/m²   Physical Exam   Constitutional: Vital signs are normal. She appears well-developed and well-nourished.  Non-toxic appearance. No distress.   HENT:   Head: Normocephalic and atraumatic.   Right Ear: Tympanic membrane, external ear and ear canal normal.   Left Ear: External ear and ear canal normal.   Nose: Mucosal edema and rhinorrhea present.   Mouth/Throat: Uvula is midline and mucous membranes are normal. Posterior oropharyngeal erythema present.   Moderate congestion. Left sided cerumen impaction.   Eyes: Lids are normal.   Neck: Neck supple.   Cardiovascular: Normal rate, regular rhythm, S1 normal, S2 normal and normal heart sounds.  Exam " reveals no gallop and no friction rub.    No murmur heard.  Pulmonary/Chest: Effort normal and breath sounds normal. No respiratory distress. She has no decreased breath sounds. She has no wheezes. She has no rhonchi. She has no rales.   Neurological: She is alert.   Skin: Skin is warm and dry. Capillary refill takes less than 2 seconds.   Psychiatric: She has a normal mood and affect. Her speech is normal and behavior is normal. Judgment and thought content normal. Cognition and memory are normal.   Vitals reviewed.        Assessment/Plan:   1. Viral URI  - guaiFENesin LA (MUCINEX) 600 MG TABLET SR 12 HR; Take 1 Tab by mouth every 12 hours.  Dispense: 28 Tab; Refill: 0  - fluticasone (FLONASE) 50 MCG/ACT nasal spray; Spray 1 Spray in nose every day.  Dispense: 16 g; Refill: 0  - DPH-Lido-AlHydr-MgHydr-Simeth (MAGIC MOUTHWASH BLM) Suspension; Take 5 mL by mouth every 6 hours as needed.  Dispense: 100 mL; Refill: 0    2. Sore throat  - POCT Rapid Strep A  - DPH-Lido-AlHydr-MgHydr-Simeth (MAGIC MOUTHWASH BLM) Suspension; Take 5 mL by mouth every 6 hours as needed.  Dispense: 100 mL; Refill: 0    VSS, no dyspnea, no SOB, and lungs CTA on PE.  Consistent with viral URI without lower respiratory involvement. Goals of care include symptomatic control and prevention of lower respiratory spread. Signs of lower respiratory involvement discussed with pt.  Pt instructed to RTC if any of these are observed.     Drink plenty of fluids and rest.  Flonase 1 squirt in each nostril, as instructed in clinic, once a day.  Use nasal saline TID to promote drainage.   Salt water gurgles to soothe sore throat.  Start OTC expectorant.  APAP for fever control, and ibuprofen for throat pain/headache relief prn.    Try to use sudafed sparingly in order to allow sinuses to drain.  Avoid the longer formulations and try to take only in the morning and/or at noon if needed.  If you fail to improve in 2-4 days or symptoms worsen/new symptoms  develop, RTC for reevaluation.    Differential diagnosis, natural history, supportive care, and indications for immediate follow-up discussed.

## 2019-05-30 ENCOUNTER — HOSPITAL ENCOUNTER (OUTPATIENT)
Facility: MEDICAL CENTER | Age: 54
End: 2019-05-30
Attending: PHYSICIAN ASSISTANT
Payer: COMMERCIAL

## 2019-05-30 ENCOUNTER — OFFICE VISIT (OUTPATIENT)
Dept: URGENT CARE | Facility: PHYSICIAN GROUP | Age: 54
End: 2019-05-30
Payer: COMMERCIAL

## 2019-05-30 VITALS
BODY MASS INDEX: 34.23 KG/M2 | SYSTOLIC BLOOD PRESSURE: 128 MMHG | WEIGHT: 186 LBS | DIASTOLIC BLOOD PRESSURE: 80 MMHG | HEART RATE: 72 BPM | OXYGEN SATURATION: 95 % | HEIGHT: 62 IN | TEMPERATURE: 97.3 F | RESPIRATION RATE: 14 BRPM

## 2019-05-30 DIAGNOSIS — N30.01 ACUTE CYSTITIS WITH HEMATURIA: Primary | ICD-10-CM

## 2019-05-30 DIAGNOSIS — N30.01 ACUTE CYSTITIS WITH HEMATURIA: ICD-10-CM

## 2019-05-30 LAB
APPEARANCE UR: NORMAL
BILIRUB UR STRIP-MCNC: NORMAL MG/DL
COLOR UR AUTO: NORMAL
GLUCOSE UR STRIP.AUTO-MCNC: NORMAL MG/DL
KETONES UR STRIP.AUTO-MCNC: NORMAL MG/DL
LEUKOCYTE ESTERASE UR QL STRIP.AUTO: NORMAL
NITRITE UR QL STRIP.AUTO: NORMAL
PH UR STRIP.AUTO: 5.5 [PH] (ref 5–8)
PROT UR QL STRIP: NORMAL MG/DL
RBC UR QL AUTO: NORMAL
SP GR UR STRIP.AUTO: 1.03
UROBILINOGEN UR STRIP-MCNC: NORMAL MG/DL

## 2019-05-30 PROCEDURE — 87086 URINE CULTURE/COLONY COUNT: CPT

## 2019-05-30 PROCEDURE — 99214 OFFICE O/P EST MOD 30 MIN: CPT | Performed by: PHYSICIAN ASSISTANT

## 2019-05-30 PROCEDURE — 81002 URINALYSIS NONAUTO W/O SCOPE: CPT | Performed by: PHYSICIAN ASSISTANT

## 2019-05-30 RX ORDER — NITROFURANTOIN 25; 75 MG/1; MG/1
100 CAPSULE ORAL EVERY 12 HOURS
Qty: 10 CAP | Refills: 0 | Status: SHIPPED | OUTPATIENT
Start: 2019-05-30 | End: 2019-06-04

## 2019-05-30 ASSESSMENT — ENCOUNTER SYMPTOMS
NAUSEA: 1
CHILLS: 1
VOMITING: 0
COUGH: 0
CONSTIPATION: 0
ABDOMINAL PAIN: 0
DIARRHEA: 0
SHORTNESS OF BREATH: 0
FLANK PAIN: 0
FEVER: 0

## 2019-05-30 NOTE — PROGRESS NOTES
Subjective:   Bryan Kearney is a 53 y.o. female who presents for UTI          Dysuria    This is a new problem. The current episode started in the past 7 days. The quality of the pain is described as burning. There has been no fever. There is no history of pyelonephritis. Associated symptoms include chills, frequency, hesitancy, nausea and urgency. Pertinent negatives include no flank pain, hematuria or vomiting. She has tried nothing for the symptoms. Her past medical history is significant for recurrent UTIs. There is no history of kidney stones or a single kidney.       Review of Systems   Constitutional: Positive for chills. Negative for fever and malaise/fatigue.   Respiratory: Negative for cough and shortness of breath.    Gastrointestinal: Positive for nausea. Negative for abdominal pain, constipation, diarrhea and vomiting.   Genitourinary: Positive for dysuria, frequency, hesitancy and urgency. Negative for flank pain and hematuria.   All other systems reviewed and are negative.      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  MEDS:   Current Outpatient Prescriptions:   •  nitrofurantoin monohyd macro (MACROBID) 100 MG Cap, Take 1 Cap by mouth every 12 hours for 5 days., Disp: 10 Cap, Rfl: 0  •  guaiFENesin LA (MUCINEX) 600 MG TABLET SR 12 HR, Take 1 Tab by mouth every 12 hours., Disp: 28 Tab, Rfl: 0  •  fluticasone (FLONASE) 50 MCG/ACT nasal spray, Spray 1 Spray in nose every day., Disp: 16 g, Rfl: 0  •  DPH-Lido-AlHydr-MgHydr-Simeth (MAGIC MOUTHWASH BLM) Suspension, Take 5 mL by mouth every 6 hours as needed., Disp: 100 mL, Rfl: 0  •  MethylPREDNISolone (MEDROL DOSEPAK) 4 MG Tablet Therapy Pack, Take 1  "Tab by mouth See Admin Instructions., Disp: 21 Tab, Rfl: 0  •  azithromycin (ZITHROMAX) 250 MG Tab, Follow package directions, Disp: 6 Tab, Rfl: 0  •  metoprolol SR (TOPROL XL) 100 MG TABLET SR 24 HR, TAKE 2 TABLETS BY MOUTH ONCE DAILY, Disp: 180 Tab, Rfl: 0  •  benzonatate (TESSALON) 100 MG Cap, Take 1 Cap by mouth 3 times a day as needed for Cough., Disp: 60 Cap, Rfl: 0  •  albuterol 108 (90 Base) MCG/ACT Aero Soln inhalation aerosol, Inhale 2 Puffs by mouth every four hours as needed., Disp: 1 Inhaler, Rfl: 0  •  simvastatin (ZOCOR) 20 MG Tab, TAKE ONE TABLET BY MOUTH IN THE EVENING, Disp: 90 Tab, Rfl: 0  •  escitalopram (LEXAPRO) 10 MG Tab, Take 1 Tab by mouth every day., Disp: 90 Tab, Rfl: 3  •  albuterol 108 (90 BASE) MCG/ACT Aero Soln inhalation aerosol, Inhale 2 Puffs by mouth every four hours as needed for Shortness of Breath., Disp: 1 Inhaler, Rfl: 1  •  aspirin EC (ECOTRIN) 81 MG Tablet Delayed Response, Take 81 mg by mouth every day., Disp: , Rfl:   ALLERGIES: No Known Allergies  SURGHX:   Past Surgical History:   Procedure Laterality Date   • SALPINGECTOMY  1999    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  She has a 38.00 pack-year smoking history. She has never used smokeless tobacco. She reports that she does not drink alcohol or use drugs.  Family History   Problem Relation Age of Onset   • Hypertension Father    • Heart Disease Father         Objective:   /80 (BP Location: Right arm, Patient Position: Sitting, BP Cuff Size: Adult)   Pulse 72   Temp 36.3 °C (97.3 °F) (Temporal)   Resp 14   Ht 1.575 m (5' 2\")   Wt 84.4 kg (186 lb)   SpO2 95%   BMI 34.02 kg/m²     Physical Exam   Constitutional: She is oriented to person, place, and time. She appears well-developed and well-nourished. No distress.   HENT:   Head: Normocephalic and atraumatic.   Nose: Nose normal.   Eyes: Pupils are equal, round, and reactive to light. " Conjunctivae are normal.   Neck: Normal range of motion. Neck supple. No tracheal deviation present.   Cardiovascular: Normal rate and regular rhythm.    Pulmonary/Chest: Effort normal and breath sounds normal.   Abdominal: There is tenderness in the suprapubic area. There is no CVA tenderness.   Neurological: She is alert and oriented to person, place, and time.   Skin: Skin is warm and dry. Capillary refill takes less than 2 seconds.   Psychiatric: She has a normal mood and affect. Her behavior is normal.   Vitals reviewed.          UA: pos hematuria     Assessment/Plan:     1. Acute cystitis with hematuria  Urine Culture    POCT Urinalysis    nitrofurantoin monohyd macro (MACROBID) 100 MG Cap       Pt will be notified if final urine cx results are not susceptible to antibiotic prescribed today.     Patient directed to take full course of abx. Supportive care measures reviewed, continue pushing fluids, probiotics/yogurt. UTI educational handout given to patient. If sx worsen or persist patient directed to return to clinic for reevaluation.     Follow-up with primary care provider within 7-10 days, red flags and emergency room precautions discussed.  Patient appears understanding of information.

## 2019-06-01 LAB
BACTERIA UR CULT: NORMAL
SIGNIFICANT IND 70042: NORMAL
SITE SITE: NORMAL
SOURCE SOURCE: NORMAL

## 2019-06-18 DIAGNOSIS — F32.1 MODERATE SINGLE CURRENT EPISODE OF MAJOR DEPRESSIVE DISORDER (HCC): ICD-10-CM

## 2019-06-18 RX ORDER — ESCITALOPRAM OXALATE 10 MG/1
10 TABLET ORAL DAILY
Qty: 90 TAB | Refills: 0 | Status: SHIPPED | OUTPATIENT
Start: 2019-06-18 | End: 2019-09-30 | Stop reason: SDUPTHER

## 2019-06-18 NOTE — TELEPHONE ENCOUNTER
Was the patient seen in the last year in this department? No     Does patient have an active prescription for medications requested? No     Received Request Via: Patient     Last OV 3/13/18, last labs 2/10/17, OV  Scheduled for 7/5/19

## 2019-06-23 DIAGNOSIS — E78.2 MIXED HYPERLIPIDEMIA: ICD-10-CM

## 2019-06-24 RX ORDER — SIMVASTATIN 20 MG
TABLET ORAL
Qty: 90 TAB | Refills: 0 | Status: SHIPPED | OUTPATIENT
Start: 2019-06-24 | End: 2019-11-26 | Stop reason: SDUPTHER

## 2019-06-24 NOTE — TELEPHONE ENCOUNTER
Was the patient seen in the last year in this department? Yes - has appt to [facility] 7/5    Does patient have an active prescription for medications requested? No     Received Request Via: Pharmacy

## 2019-07-05 ENCOUNTER — OFFICE VISIT (OUTPATIENT)
Dept: MEDICAL GROUP | Facility: PHYSICIAN GROUP | Age: 54
End: 2019-07-05
Payer: COMMERCIAL

## 2019-07-05 VITALS
BODY MASS INDEX: 34.6 KG/M2 | HEART RATE: 62 BPM | WEIGHT: 188 LBS | RESPIRATION RATE: 14 BRPM | SYSTOLIC BLOOD PRESSURE: 122 MMHG | OXYGEN SATURATION: 97 % | TEMPERATURE: 98 F | DIASTOLIC BLOOD PRESSURE: 64 MMHG | HEIGHT: 62 IN

## 2019-07-05 DIAGNOSIS — Z13.29 SCREENING FOR THYROID DISORDER: ICD-10-CM

## 2019-07-05 DIAGNOSIS — K64.9 HEMORRHOIDS, UNSPECIFIED HEMORRHOID TYPE: ICD-10-CM

## 2019-07-05 DIAGNOSIS — R60.0 LOWER EXTREMITY EDEMA: ICD-10-CM

## 2019-07-05 DIAGNOSIS — Z13.6 SCREENING FOR CARDIOVASCULAR CONDITION: ICD-10-CM

## 2019-07-05 DIAGNOSIS — E78.2 MIXED HYPERLIPIDEMIA: ICD-10-CM

## 2019-07-05 DIAGNOSIS — F32.1 CURRENT MODERATE EPISODE OF MAJOR DEPRESSIVE DISORDER WITHOUT PRIOR EPISODE (HCC): ICD-10-CM

## 2019-07-05 DIAGNOSIS — I99.8 VASCULAR INSUFFICIENCY: ICD-10-CM

## 2019-07-05 DIAGNOSIS — I10 ESSENTIAL HYPERTENSION, BENIGN: ICD-10-CM

## 2019-07-05 DIAGNOSIS — Z12.39 BREAST CANCER SCREENING: ICD-10-CM

## 2019-07-05 PROCEDURE — 99214 OFFICE O/P EST MOD 30 MIN: CPT | Performed by: NURSE PRACTITIONER

## 2019-07-05 RX ORDER — HYDROCHLOROTHIAZIDE 12.5 MG/1
CAPSULE, GELATIN COATED ORAL
Qty: 90 CAP | Refills: 0 | Status: SHIPPED | OUTPATIENT
Start: 2019-07-05 | End: 2020-09-15

## 2019-07-05 NOTE — PROGRESS NOTES
CC:  To establish care, leg swelling    HISTORY OF THE PRESENT ILLNESS: Patient is a 53 y.o. female. This pleasant patient is here today to establish care for evaluation management following health problems.  Patient's previous primary care provider is Myrtle MAZARIEGOS.    Health Maintenance: due  Patient will schedule Pap with her gynecologist at [place].      Mixed hyperlipidemia  Chronic health problem. Uncertain of control.  Taking simvastain 20 mg daily.  Tolerating medication, denies muscle aches or nausea.  Will get updated lipid profile.  Reviewed lifestyle measures including routine aerobic exercise, weight loss, low fat diet.  Patient understands no further refills until labs have been done.    Component      Latest Ref Rng & Units 2/10/2017           9:07 AM   Cholesterol,Tot      100 - 199 mg/dL 248 (H)   Triglycerides      0 - 149 mg/dL 110   HDL      >=40 mg/dL 61   LDL      <100 mg/dL 165 (H)       Essential hypertension, benign  This is a chronic health problem that is well controlled with current medications and lifestyle measures.  Taking metoprolol  mg daily.   Home readings 120's/80's.  The patient denies chest pain, shortness of breath, headache, vision changes, epistaxis, or dyspnea on exertion.  Does report bilateral lower extremity edema after being on feet all day.    Major depression, single episode  This is a chronic health problem that is well controlled with current medications and lifestyle measures. Taking escitalopram 10 mg daily. Has not gone to counseling.  Feels have a good relationship with family and can talk.  Denies si/hi    Vascular insufficiency  Patient reports bilateral lower extremity edema.  Worse when has been on feet for 12 hour shift.  Denies shortness of breath, chest pain, low ext erythema, pain.  Wearing compression stockings with some relief, drinking at least 60 oz water daily, tries to elevate low ext during breaks.  Swelling does decrease while  sleeping.    Hemorrhoids  Patient reports long history of hemorrhoids.  Sometimes bleed. Painful. Seems to be worse after a long week of work including overtime.  Does have occasional constipation.  Patient requesting referral for evaluation for hemorrhoidectomy.      Allergies: Patient has no known allergies.    Current Outpatient Prescriptions Ordered in [facility]   Medication Sig Dispense Refill   • Cholecalciferol (VITAMIN D3) 1000 units Cap Take  by mouth.     • hydrochlorothiazide (MICROZIDE) 12.5 MG capsule Take one tab daily as needed for leg swelling. 90 Cap 0   • simvastatin (ZOCOR) 20 MG Tab TAKE 1 TABLET BY MOUTH IN THE EVENING 90 Tab 0   • escitalopram (LEXAPRO) 10 MG Tab Take 1 Tab by mouth every day. 90 Tab 0   • metoprolol SR (TOPROL XL) 100 MG TABLET SR 24 HR TAKE 2 TABLETS BY MOUTH ONCE DAILY 180 Tab 0   • aspirin EC (ECOTRIN) 81 MG Tablet Delayed Response Take 81 mg by mouth every day.     • guaiFENesin LA (MUCINEX) 600 MG TABLET SR 12 HR Take 1 Tab by mouth every 12 hours. 28 Tab 0   • fluticasone (FLONASE) 50 MCG/ACT nasal spray Spray 1 Spray in nose every day. 16 g 0   • albuterol 108 (90 BASE) MCG/ACT Aero Soln inhalation aerosol Inhale 2 Puffs by mouth every four hours as needed for Shortness of Breath. 1 Inhaler 1     No current Epic-ordered facility-administered medications on file.        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   • TONSILLECTOMY         Social History   Substance Use Topics   • Smoking status: Current Every Day Smoker     Packs/day: 1.00     " Years: 38.00     Types: Cigarettes   • Smokeless tobacco: Never Used   • Alcohol use No       Family History   Problem Relation Age of Onset   • Hypertension Father    • Heart Disease Father        ROS:   As in HPI, otherwise negative for chest pain, dyspnea, abdominal pain, dysuria, blood in stool, fever            Exam: /64 (BP Location: Left arm, Patient Position: Sitting, BP Cuff Size: Adult)   Pulse 62   Temp 36.7 °C (98 °F)   Resp 14   Ht 1.575 m (5' 2\")   Wt 85.3 kg (188 lb)   SpO2 97%  Body mass index is 34.39 kg/m².    General: Alert, pleasant, well nourished, well developed female in NAD  HEENT: Normocephalic. Eyes conjunctiva clear lids without ptosis, pupils equal and reactive to light, ears normal shape and contour, canals are clear bilaterally, tympanic membranes are pearly gray with good light reflex, nasal mucosa without erythema and drainage, oropharynx is without erythema, edema or exudates.   Neck: Supple without bruit. Thyroid is not enlarged.  Pulmonary: Clear to ausculation.  Normal effort. No rales, ronchi, or wheezing.  Cardiovascular: Normal rate and rhythm without murmur. Carotid and radial pulses are intact and equal bilaterally.  No carotid bruit.  Abdomen: Soft, nontender, nondistended. Normal bowel sounds. Liver and spleen are not palpable  Neurologic: Grossly nonfocal  Lymph: No cervical or supraclavicular lymph nodes are palpable  Lower extremities: no erythema, trace pitting edema at ankle and pretibial, pedal pulses palpable and equal  Skin: Warm and dry.    Musculoskeletal: Normal gait.   Psych: Normal mood and affect. Alert and oriented. Judgment and insight is normal.    Please note that this dictation was created using voice recognition software. I have made every reasonable attempt to correct obvious errors, but I expect that there are errors of grammar and possibly content that I did not discover before finalizing the note.      Assessment/Plan  1. Mixed " hyperlipidemia  Continue with simvastatin.  Reviewed lifestyle measures as listed in HPI.  Patient is overdue for lipid profile.  She will get updated lipid profile.  After I receive lab results, will refill medication  - Lipid Profile; Future    2. Essential hypertension, benign  Continue with metoprolol and hydrochlorothiazide.  Lifestyle measures reviewed.  Patient overdue for labs.  She will get lab work done prior to further refills.  - Comp Metabolic Panel; Future  - ESTIMATED GFR; Future  - hydrochlorothiazide (MICROZIDE) 12.5 MG capsule; Take one tab daily as needed for leg swelling.  Dispense: 90 Cap; Refill: 0    3. Current moderate episode of major depressive disorder without prior episode (HCC)  Continue with escitalopram.  Reviewed lifestyle measures.    4. Breast cancer screening  Ordered.  - MA-SCREENING MAMMO BILAT W/TOMOSYNTHESIS W/CAD; Future    5. Screening for thyroid disorder  We will notify patient of lab results via letter or telephone call.  - TSH; Future  - FREE THYROXINE; Future    6. Lower extremity edema  Patient has been working on lifestyle measures to help with swelling in legs.  Likely vascular insufficiency as swelling goes down with leg elevation.  Differential includes congestive heart failure, not likely as patient is not short of breath.  - hydrochlorothiazide (MICROZIDE) 12.5 MG capsule; Take one tab daily as needed for leg swelling.  Dispense: 90 Cap; Refill: 0    7. Screening for cardiovascular condition    - Comp Metabolic Panel; Future    8. Hemorrhoids, unspecified hemorrhoid type  Patient has been struggling with hemorrhoids for many years.  She is requesting referral for hemorrhoidectomy.  - REFERRAL TO GASTROENTEROLOGY    9. Vascular insufficiency    - hydrochlorothiazide (MICROZIDE) 12.5 MG capsule; Take one tab daily as needed for leg swelling.  Dispense: 90 Cap; Refill: 0      Patient will return to clinic in 1 year or sooner if needed and pending lab results.

## 2019-07-05 NOTE — PATIENT INSTRUCTIONS
High-Fiber Diet  Fiber, also called dietary fiber, is a type of carbohydrate found in fruits, vegetables, whole grains, and beans. A high-fiber diet can have many health benefits. Your health care provider may recommend a high-fiber diet to help:  · Prevent constipation. Fiber can make your bowel movements more regular.  · Lower your cholesterol.  · Relieve hemorrhoids, uncomplicated diverticulosis, or irritable bowel syndrome.  · Prevent overeating as part of a weight-loss plan.  · Prevent heart disease, type 2 diabetes, and certain cancers.  What is my plan?  The recommended daily intake of fiber includes:  · 38 grams for men under age 50.  · 30 grams for men over age 50.  · 25 grams for women under age 50.  · 21 grams for women over age 50.  You can get the recommended daily intake of dietary fiber by eating a variety of fruits, vegetables, grains, and beans. Your health care provider may also recommend a fiber supplement if it is not possible to get enough fiber through your diet.  What do I need to know about a high-fiber diet?  · Fiber supplements have not been widely studied for their effectiveness, so it is better to get fiber through food sources.  · Always check the fiber content on the nutrition facts label of any prepackaged food. Look for foods that contain at least 5 grams of fiber per serving.  · Ask your dietitian if you have questions about specific foods that are related to your condition, especially if those foods are not listed in the following section.  · Increase your daily fiber consumption gradually. Increasing your intake of dietary fiber too quickly may cause bloating, cramping, or gas.  · Drink plenty of water. Water helps you to digest fiber.  What foods can I eat?  Grains   Whole-grain breads. Multigrain cereal. Oats and oatmeal. Brown rice. Barley. Bulgur wheat. Millet. Bran muffins. Popcorn. Rye wafer crackers.  Vegetables   Sweet potatoes. Spinach. Kale. Artichokes. Cabbage. Broccoli.  Green peas. Carrots. Squash.  Fruits   Berries. Pears. Apples. Oranges. Avocados. Prunes and raisins. Dried figs.  Meats and Other Protein Sources   Navy, kidney, lo, and soy beans. Split peas. Lentils. Nuts and seeds.  Dairy   Fiber-fortified yogurt.  Beverages   Fiber-fortified soy milk. Fiber-fortified orange juice.  Other   Fiber bars.  The items listed above may not be a complete list of recommended foods or beverages. Contact your dietitian for more options.   What foods are not recommended?  Grains   White bread. Pasta made with refined flour. White rice.  Vegetables   Fried potatoes. Canned vegetables. Well-cooked vegetables.  Fruits   Fruit juice. Cooked, strained fruit.  Meats and Other Protein Sources   Fatty cuts of meat. Fried poultry or fried fish.  Dairy   Milk. Yogurt. Cream cheese. Sour cream.  Beverages   Soft drinks.  Other   Cakes and pastries. Butter and oils.  The items listed above may not be a complete list of foods and beverages to avoid. Contact your dietitian for more information.   What are some tips for including high-fiber foods in my diet?  · Eat a wide variety of high-fiber foods.  · Make sure that half of all grains consumed each day are whole grains.  · Replace breads and cereals made from refined flour or white flour with whole-grain breads and cereals.  · Replace white rice with brown rice, bulgur wheat, or millet.  · Start the day with a breakfast that is high in fiber, such as a cereal that contains at least 5 grams of fiber per serving.  · Use beans in place of meat in soups, salads, or pasta.  · Eat high-fiber snacks, such as berries, raw vegetables, nuts, or popcorn.  This information is not intended to replace advice given to you by your health care provider. Make sure you discuss any questions you have with your health care provider.  Document Released: 12/18/2006 Document Revised: 05/25/2017 Document Reviewed: 06/02/2015  [employer] Interactive Patient Education © 2017  [employer]

## 2019-07-05 NOTE — ASSESSMENT & PLAN NOTE
This is a chronic health problem that is well controlled with current medications and lifestyle measures. Taking escitalopram 10 mg daily. Has not gone to counseling.  Feels have a good relationship with family and can talk.  Denies si/hi

## 2019-07-05 NOTE — ASSESSMENT & PLAN NOTE
Chronic health problem. Uncertain of control.  Taking simvastain 20 mg daily.  Tolerating medication, denies muscle aches or nausea.  Will get updated lipid profile.  Reviewed lifestyle measures including routine aerobic exercise, weight loss, low fat diet.  Patient understands no further refills until labs have been done.    Component      Latest Ref Rng & Units 2/10/2017           9:07 AM   Cholesterol,Tot      100 - 199 mg/dL 248 (H)   Triglycerides      0 - 149 mg/dL 110   HDL      >=40 mg/dL 61   LDL      <100 mg/dL 165 (H)

## 2019-07-05 NOTE — ASSESSMENT & PLAN NOTE
Patient reports long history of hemorrhoids.  Sometimes bleed. Painful. Seems to be worse after a long week of work including overtime.  Does have occasional constipation.  Patient requesting referral for evaluation for hemorrhoidectomy.

## 2019-07-05 NOTE — ASSESSMENT & PLAN NOTE
Patient reports bilateral lower extremity edema.  Worse when has been on feet for 12 hour shift.  Denies shortness of breath, chest pain, low ext erythema, pain.  Wearing compression stockings with some relief, drinking at least 60 oz water daily, tries to elevate low ext during breaks.  Swelling does decrease while sleeping.

## 2019-07-05 NOTE — ASSESSMENT & PLAN NOTE
This is a chronic health problem that is well controlled with current medications and lifestyle measures.  Taking metoprolol  mg daily.   Home readings 120's/80's.  The patient denies chest pain, shortness of breath, headache, vision changes, epistaxis, or dyspnea on exertion.  Does report bilateral lower extremity edema after being on feet all day.

## 2019-07-30 DIAGNOSIS — R51.9 FREQUENT HEADACHES: ICD-10-CM

## 2019-07-30 DIAGNOSIS — I10 ESSENTIAL HYPERTENSION, BENIGN: ICD-10-CM

## 2019-07-31 RX ORDER — METOPROLOL SUCCINATE 100 MG/1
TABLET, EXTENDED RELEASE ORAL
Qty: 180 TAB | Refills: 0 | Status: SHIPPED | OUTPATIENT
Start: 2019-07-31 | End: 2019-11-26 | Stop reason: SDUPTHER

## 2019-07-31 NOTE — TELEPHONE ENCOUNTER
Was the patient seen in the last year in this department? Yes    Does patient have an active prescription for medications requested? No     Received Request Via: Pharmacy    Pt met protocol?: Yes     Last OV 07/05/19    BP Readings from Last 1 Encounters:   07/05/19 122/64

## 2019-11-23 DIAGNOSIS — I10 ESSENTIAL HYPERTENSION, BENIGN: ICD-10-CM

## 2019-11-23 DIAGNOSIS — R51.9 FREQUENT HEADACHES: ICD-10-CM

## 2019-11-25 RX ORDER — METOPROLOL SUCCINATE 100 MG/1
TABLET, EXTENDED RELEASE ORAL
Qty: 180 TAB | Refills: 0 | OUTPATIENT
Start: 2019-11-25

## 2019-11-25 NOTE — TELEPHONE ENCOUNTER
NOTE TO PT ON LAST RX OF 07/3/2019, TO GET LABS DONE FOR FURTHER REFILLS.    Lab Results   Component Value Date/Time    CHOLSTRLTOT 248 (H) 02/10/2017 09:07 AM     (H) 02/10/2017 09:07 AM    HDL 61 02/10/2017 09:07 AM    TRIGLYCERIDE 110 02/10/2017 09:07 AM       Lab Results   Component Value Date/Time    SODIUM 142 02/10/2017 09:07 AM    POTASSIUM 4.7 02/10/2017 09:07 AM    CHLORIDE 109 02/10/2017 09:07 AM    CO2 28 02/10/2017 09:07 AM    GLUCOSE 102 (H) 02/10/2017 09:07 AM    BUN 22 02/10/2017 09:07 AM    CREATININE 0.99 02/10/2017 09:07 AM     Lab Results   Component Value Date/Time    ALKPHOSPHAT 73 02/10/2017 09:07 AM    ASTSGOT 19 02/10/2017 09:07 AM    ALTSGPT 15 02/10/2017 09:07 AM    TBILIRUBIN 0.4 02/10/2017 09:07 AM

## 2019-11-26 ENCOUNTER — HOSPITAL ENCOUNTER (OUTPATIENT)
Dept: LAB | Facility: MEDICAL CENTER | Age: 54
End: 2019-11-26
Attending: NURSE PRACTITIONER
Payer: COMMERCIAL

## 2019-11-26 DIAGNOSIS — Z13.6 SCREENING FOR CARDIOVASCULAR CONDITION: ICD-10-CM

## 2019-11-26 DIAGNOSIS — I10 ESSENTIAL HYPERTENSION, BENIGN: ICD-10-CM

## 2019-11-26 DIAGNOSIS — F32.1 MODERATE SINGLE CURRENT EPISODE OF MAJOR DEPRESSIVE DISORDER (HCC): ICD-10-CM

## 2019-11-26 DIAGNOSIS — Z13.29 SCREENING FOR THYROID DISORDER: ICD-10-CM

## 2019-11-26 DIAGNOSIS — E78.2 MIXED HYPERLIPIDEMIA: ICD-10-CM

## 2019-11-26 DIAGNOSIS — R51.9 FREQUENT HEADACHES: ICD-10-CM

## 2019-11-26 LAB
ALBUMIN SERPL BCP-MCNC: 4.7 G/DL (ref 3.2–4.9)
ALBUMIN/GLOB SERPL: 1.9 G/DL
ALP SERPL-CCNC: 83 U/L (ref 30–99)
ALT SERPL-CCNC: 30 U/L (ref 2–50)
ANION GAP SERPL CALC-SCNC: 9 MMOL/L (ref 0–11.9)
AST SERPL-CCNC: 31 U/L (ref 12–45)
BILIRUB SERPL-MCNC: 1 MG/DL (ref 0.1–1.5)
BUN SERPL-MCNC: 11 MG/DL (ref 8–22)
CALCIUM SERPL-MCNC: 9.6 MG/DL (ref 8.5–10.5)
CHLORIDE SERPL-SCNC: 106 MMOL/L (ref 96–112)
CHOLEST SERPL-MCNC: 235 MG/DL (ref 100–199)
CO2 SERPL-SCNC: 26 MMOL/L (ref 20–33)
CREAT SERPL-MCNC: 0.89 MG/DL (ref 0.5–1.4)
GLOBULIN SER CALC-MCNC: 2.5 G/DL (ref 1.9–3.5)
GLUCOSE SERPL-MCNC: 86 MG/DL (ref 65–99)
HDLC SERPL-MCNC: 62 MG/DL
LDLC SERPL CALC-MCNC: 140 MG/DL
POTASSIUM SERPL-SCNC: 4.1 MMOL/L (ref 3.6–5.5)
PROT SERPL-MCNC: 7.2 G/DL (ref 6–8.2)
SODIUM SERPL-SCNC: 141 MMOL/L (ref 135–145)
T4 FREE SERPL-MCNC: 0.87 NG/DL (ref 0.53–1.43)
TRIGL SERPL-MCNC: 166 MG/DL (ref 0–149)
TSH SERPL DL<=0.005 MIU/L-ACNC: 1.05 UIU/ML (ref 0.38–5.33)

## 2019-11-26 PROCEDURE — 84443 ASSAY THYROID STIM HORMONE: CPT

## 2019-11-26 PROCEDURE — 36415 COLL VENOUS BLD VENIPUNCTURE: CPT

## 2019-11-26 PROCEDURE — 84439 ASSAY OF FREE THYROXINE: CPT

## 2019-11-26 PROCEDURE — 80061 LIPID PANEL: CPT

## 2019-11-26 PROCEDURE — 80053 COMPREHEN METABOLIC PANEL: CPT

## 2019-11-27 RX ORDER — ESCITALOPRAM OXALATE 10 MG/1
TABLET ORAL
Qty: 90 TAB | Refills: 1 | Status: SHIPPED | OUTPATIENT
Start: 2019-11-27 | End: 2020-09-09 | Stop reason: SDUPTHER

## 2019-11-27 RX ORDER — SIMVASTATIN 20 MG
TABLET ORAL
Qty: 90 TAB | Refills: 1 | Status: SHIPPED | OUTPATIENT
Start: 2019-11-27 | End: 2020-06-15

## 2019-11-27 RX ORDER — METOPROLOL SUCCINATE 100 MG/1
200 TABLET, EXTENDED RELEASE ORAL DAILY
Qty: 180 TAB | Refills: 1 | Status: SHIPPED | OUTPATIENT
Start: 2019-11-27 | End: 2020-06-15

## 2019-11-27 NOTE — TELEPHONE ENCOUNTER
Was the patient seen in the last year in this department? Yes    Does patient have an active prescription for medications requested? No     Received Request Via: Pharmacy    Pt met protocol?: Yes     Last OV 07/05/19    BP Readings from Last 1 Encounters:   07/05/19 122/64     Lab Results   Component Value Date/Time    CHOLSTRLTOT 235 (H) 11/26/2019 0830    TRIGLYCERIDE 166 (H) 11/26/2019 0830    HDL 62 11/26/2019 0830     (H) 11/26/2019 0830

## 2019-11-27 NOTE — TELEPHONE ENCOUNTER
Pt last seen regarding this issue 7/19. Will send 6 months of fills to pharmacy.    Lab Results   Component Value Date/Time    CHOLSTRLTOT 235 (H) 11/26/2019 08:30 AM     (H) 11/26/2019 08:30 AM    HDL 62 11/26/2019 08:30 AM    TRIGLYCERIDE 166 (H) 11/26/2019 08:30 AM       Lab Results   Component Value Date/Time    SODIUM 141 11/26/2019 08:30 AM    POTASSIUM 4.1 11/26/2019 08:30 AM    CHLORIDE 106 11/26/2019 08:30 AM    CO2 26 11/26/2019 08:30 AM    GLUCOSE 86 11/26/2019 08:30 AM    BUN 11 11/26/2019 08:30 AM    CREATININE 0.89 11/26/2019 08:30 AM     Lab Results   Component Value Date/Time    ALKPHOSPHAT 83 11/26/2019 08:30 AM    ASTSGOT 31 11/26/2019 08:30 AM    ALTSGPT 30 11/26/2019 08:30 AM    TBILIRUBIN 1.0 11/26/2019 08:30 AM      ;

## 2019-11-29 ENCOUNTER — OFFICE VISIT (OUTPATIENT)
Dept: URGENT CARE | Facility: PHYSICIAN GROUP | Age: 54
End: 2019-11-29
Payer: COMMERCIAL

## 2019-11-29 VITALS
OXYGEN SATURATION: 93 % | WEIGHT: 186 LBS | RESPIRATION RATE: 16 BRPM | HEIGHT: 62 IN | BODY MASS INDEX: 34.23 KG/M2 | TEMPERATURE: 98.6 F | DIASTOLIC BLOOD PRESSURE: 76 MMHG | SYSTOLIC BLOOD PRESSURE: 118 MMHG | HEART RATE: 92 BPM

## 2019-11-29 DIAGNOSIS — R68.89 FLU-LIKE SYMPTOMS: ICD-10-CM

## 2019-11-29 DIAGNOSIS — R06.02 SOB (SHORTNESS OF BREATH): ICD-10-CM

## 2019-11-29 LAB
FLUAV+FLUBV AG SPEC QL IA: NEGATIVE
INT CON NEG: NEGATIVE
INT CON POS: POSITIVE

## 2019-11-29 PROCEDURE — 87804 INFLUENZA ASSAY W/OPTIC: CPT | Performed by: PHYSICIAN ASSISTANT

## 2019-11-29 PROCEDURE — 99214 OFFICE O/P EST MOD 30 MIN: CPT | Performed by: PHYSICIAN ASSISTANT

## 2019-11-29 RX ORDER — PREDNISONE 10 MG/1
TABLET ORAL
Qty: 1 QUANTITY SUFFICIENT | Refills: 0 | Status: SHIPPED
Start: 2019-11-29 | End: 2020-03-21

## 2019-11-29 RX ORDER — AZITHROMYCIN 250 MG/1
TABLET, FILM COATED ORAL
Qty: 6 TAB | Refills: 0 | Status: SHIPPED | OUTPATIENT
Start: 2019-11-29 | End: 2020-03-21

## 2019-11-29 NOTE — LETTER
November 29, 2019         Patient: Bryan Kearney   YOB: 1965   Date of Visit: 11/29/2019           To Whom it May Concern:    Bryan Kearney was seen in my clinic on 11/29/2019. She may return to work on 11/30.    If you have any questions or concerns, please don't hesitate to call.        Sincerely,           Cara Fitzgerald P.A.-C.  Electronically Signed

## 2019-11-29 NOTE — PROGRESS NOTES
Chief Complaint   Patient presents with   • Cough       HISTORY OF PRESENT ILLNESS: Patient is a 54 y.o. female who presents today for the following:    Patient comes in for evaluation of a cough that started yesterday.  She reports acute onset fever and body aches.  T-max was 100.5.  She is been taking some Mucinex and cough and cold medications.  She has been unable to cough up any sputum.  She reports mild shortness of breath.  She does smoke but has not smoked over the last couple days.    Patient Active Problem List    Diagnosis Date Noted   • Vascular insufficiency 07/05/2019   • Hemorrhoids 07/05/2019   • Health care maintenance 03/13/2018   • Obesity (BMI 30-39.9) 03/13/2018   • Postmenopausal 03/13/2018   • Major depression, single episode 10/04/2013   • Essential hypertension, benign 02/26/2013   • Vitamin D deficiency 05/23/2011   • Tobacco use disorder 02/01/2010   • Mixed hyperlipidemia        Allergies:Patient has no known allergies.    Current Outpatient Medications Ordered in Epic   Medication Sig Dispense Refill   • predniSONE (DELTASONE) 10 MG Tab 50 mg x 1 day; 40 mg x 1 day; 30 mg x 1 day; 20 mg x 1 day; 10 mg x 1 day 1 Quantity Sufficient 0   • azithromycin (ZITHROMAX) 250 MG Tab Use as package directs 6 Tab 0   • simvastatin (ZOCOR) 20 MG Tab TAKE 1 TABLET BY MOUTH IN THE EVENING 90 Tab 1   • escitalopram (LEXAPRO) 10 MG Tab TAKE 1 TABLET BY MOUTH ONCE DAILY 90 Tab 1   • metoprolol SR (TOPROL XL) 100 MG TABLET SR 24 HR Take 2 Tabs by mouth every day. 180 Tab 1   • Cholecalciferol (VITAMIN D3) 1000 units Cap Take  by mouth.     • hydrochlorothiazide (MICROZIDE) 12.5 MG capsule Take one tab daily as needed for leg swelling. 90 Cap 0   • guaiFENesin LA (MUCINEX) 600 MG TABLET SR 12 HR Take 1 Tab by mouth every 12 hours. 28 Tab 0   • fluticasone (FLONASE) 50 MCG/ACT nasal spray Spray 1 Spray in nose every day. (Patient not taking: Reported on 11/29/2019) 16 g 0   • albuterol 108 (90 BASE) MCG/ACT  Aero Soln inhalation aerosol Inhale 2 Puffs by mouth every four hours as needed for Shortness of Breath. 1 Inhaler 1   • aspirin EC (ECOTRIN) 81 MG Tablet Delayed Response Take 81 mg by mouth every day.       No current [facility]-ordered facility-administered medications on file.        Past Medical History:   Diagnosis Date   • Bone island 2011   • Bone island 2011   • Elevated BP 10/5/2009   • Essential hypertension, benign 2013   • Major depression, single episode 10/4/2013   • Mixed hyperlipidemia 10/5/09    uncontrolled   • Premature menopause 10/5/2009   • Tobacco use disorder 2010   • Unspecified vitamin D deficiency 2011       Social History     Tobacco Use   • Smoking status: Current Every Day Smoker     Packs/day: 1.00     Years: 38.00     Pack years: 38.00     Types: Cigarettes   • Smokeless tobacco: Never Used   Substance Use Topics   • Alcohol use: No   • Drug use: No       Family Status   Relation Name Status   • Mo          hemorrhagic bleed due to anuerysm   • Fa          myocardial infarction     Family History   Problem Relation Age of Onset   • Hypertension Father    • Heart Disease Father        Review of Systems:   Constitutional ROS: No unexpected change in weight, No weakness, No fatigue  Eye ROS: No recent significant change in vision, No eye pain, redness, discharge  Ear ROS: No drainage, No tinnitus or vertigo, No recent change in hearing  Mouth/Throat ROS: No teeth or gum problems, No bleeding gums, No tongue complaints  Neck ROS: No swollen glands, No significant pain in neck  Pulmonary ROS: Mild shortness of breath.  Cardiovascular ROS: No diaphoresis, No edema, No palpitations  Gastrointestinal ROS: No change in bowel habits, No significant change in appetite, No nausea, vomiting, diarrhea, or constipation  Musculoskeletal/Extremities ROS: No peripheral edema, No pain, redness or swelling on the joints  Hematologic/Lymphatic ROS: Positive for  "fever.  Skin/Integumentary ROS: No edema, No evidence of rash, No itching      Exam:  /76 (BP Location: Right arm, Patient Position: Sitting, BP Cuff Size: Large adult)   Pulse 92   Temp 37 °C (98.6 °F) (Temporal)   Resp 16   Ht 1.575 m (5' 2\")   Wt 84.4 kg (186 lb)   SpO2 93% REPEAT O2: 96% on RA  General: Well developed, well nourished. No distress.  Eye: PERRL/EOMI; conjunctivae clear, lids normal.  ENMT: Lips without lesions, MMM. Oropharynx is clear. Bilateral TMs are within normal limits.  Pulmonary: Unlabored respiratory effort.  Diffuse, coarse fine and expiratory wheezes.  Cardiovascular: Regular rate and rhythm without murmur.   Neurologic: Grossly nonfocal. No facial asymmetry noted.  Lymph: No cervical lymphadenopathy noted.  Skin: Warm, dry, good turgor. No rashes in visible areas.   Psych: Normal mood. Alert and oriented x3. Judgment and insight is normal.    Rapid influenza: Negative    Assessment/Plan:  Discussed likely viral etiology. Discussed appropriate over-the-counter symptomatic medication, and when to return to clinic. Contingent antibiotic prescription given to patient to fill upon meeting criteria of guidelines discussed. Follow up for worsening or persistent symptoms.  1. Flu-like symptoms  POCT Influenza A/B    azithromycin (ZITHROMAX) 250 MG Tab   2. SOB (shortness of breath)  predniSONE (DELTASONE) 10 MG Tab       "

## 2019-12-06 ENCOUNTER — HOSPITAL ENCOUNTER (OUTPATIENT)
Facility: MEDICAL CENTER | Age: 54
End: 2019-12-06
Attending: PHYSICIAN ASSISTANT
Payer: COMMERCIAL

## 2019-12-06 ENCOUNTER — OFFICE VISIT (OUTPATIENT)
Dept: URGENT CARE | Facility: PHYSICIAN GROUP | Age: 54
End: 2019-12-06
Payer: COMMERCIAL

## 2019-12-06 VITALS
BODY MASS INDEX: 34.23 KG/M2 | HEART RATE: 80 BPM | DIASTOLIC BLOOD PRESSURE: 84 MMHG | SYSTOLIC BLOOD PRESSURE: 120 MMHG | TEMPERATURE: 98.4 F | HEIGHT: 62 IN | OXYGEN SATURATION: 96 % | WEIGHT: 186 LBS | RESPIRATION RATE: 16 BRPM

## 2019-12-06 DIAGNOSIS — N30.01 ACUTE CYSTITIS WITH HEMATURIA: ICD-10-CM

## 2019-12-06 DIAGNOSIS — J98.8 WHEEZING-ASSOCIATED RESPIRATORY INFECTION (WARI): ICD-10-CM

## 2019-12-06 LAB
APPEARANCE UR: CLEAR
BILIRUB UR STRIP-MCNC: NORMAL MG/DL
COLOR UR AUTO: YELLOW
GLUCOSE UR STRIP.AUTO-MCNC: NORMAL MG/DL
KETONES UR STRIP.AUTO-MCNC: NORMAL MG/DL
LEUKOCYTE ESTERASE UR QL STRIP.AUTO: NORMAL
NITRITE UR QL STRIP.AUTO: NORMAL
PH UR STRIP.AUTO: 7.5 [PH] (ref 5–8)
PROT UR QL STRIP: 100 MG/DL
RBC UR QL AUTO: NORMAL
SP GR UR STRIP.AUTO: 1.02
UROBILINOGEN UR STRIP-MCNC: NORMAL MG/DL

## 2019-12-06 PROCEDURE — 87086 URINE CULTURE/COLONY COUNT: CPT

## 2019-12-06 PROCEDURE — 99214 OFFICE O/P EST MOD 30 MIN: CPT | Performed by: PHYSICIAN ASSISTANT

## 2019-12-06 PROCEDURE — 81002 URINALYSIS NONAUTO W/O SCOPE: CPT | Performed by: PHYSICIAN ASSISTANT

## 2019-12-06 RX ORDER — SULFAMETHOXAZOLE AND TRIMETHOPRIM 800; 160 MG/1; MG/1
1 TABLET ORAL EVERY 12 HOURS
Qty: 14 TAB | Refills: 0 | Status: SHIPPED | OUTPATIENT
Start: 2019-12-06 | End: 2019-12-13

## 2019-12-06 RX ORDER — ALBUTEROL SULFATE 90 UG/1
2 AEROSOL, METERED RESPIRATORY (INHALATION) EVERY 4 HOURS PRN
Qty: 1 INHALER | Refills: 0 | Status: SHIPPED | OUTPATIENT
Start: 2019-12-06 | End: 2022-05-31 | Stop reason: SDUPTHER

## 2019-12-06 NOTE — PATIENT INSTRUCTIONS
Steps to Quit Smoking  Smoking tobacco can be harmful to your health and can affect almost every organ in your body. Smoking puts you, and those around you, at risk for developing many serious chronic diseases. Quitting smoking is difficult, but it is one of the best things that you can do for your health. It is never too late to quit.  What are the benefits of quitting smoking?  When you quit smoking, you lower your risk of developing serious diseases and conditions, such as:  · Lung cancer or lung disease, such as COPD.  · Heart disease.  · Stroke.  · Heart attack.  · Infertility.  · Osteoporosis and bone fractures.  Additionally, symptoms such as coughing, wheezing, and shortness of breath may get better when you quit. You may also find that you get sick less often because your body is stronger at fighting off colds and infections. If you are pregnant, quitting smoking can help to reduce your chances of having a baby of low birth weight.  How do I get ready to quit?  When you decide to quit smoking, create a plan to make sure that you are successful. Before you quit:  · Pick a date to quit. Set a date within the next two weeks to give you time to prepare.  · Write down the reasons why you are quitting. Keep this list in places where you will see it often, such as on your bathroom mirror or in your car or wallet.  · Identify the people, places, things, and activities that make you want to smoke (triggers) and avoid them. Make sure to take these actions:  ¨ Throw away all cigarettes at home, at work, and in your car.  ¨ Throw away smoking accessories, such as ashtrays and lighters.  ¨ Clean your car and make sure to empty the ashtray.  ¨ Clean your home, including curtains and carpets.  · Tell your family, friends, and coworkers that you are quitting. Support from your loved ones can make quitting easier.  · Talk with your health care provider about your options for quitting smoking.  · Find out what treatment  options are covered by your health insurance.  What strategies can I use to quit smoking?  Talk with your healthcare provider about different strategies to quit smoking. Some strategies include:  · Quitting smoking altogether instead of gradually lessening how much you smoke over a period of time. Research shows that quitting “cold turkey” is more successful than gradually quitting.  · Attending in-person counseling to help you build problem-solving skills. You are more likely to have success in quitting if you attend several counseling sessions. Even short sessions of 10 minutes can be effective.  · Finding resources and support systems that can help you to quit smoking and remain smoke-free after you quit. These resources are most helpful when you use them often. They can include:  ¨ Online chats with a counselor.  ¨ Telephone quitlines.  ¨ Printed self-help materials.  ¨ Support groups or group counseling.  ¨ Text messaging programs.  ¨ Mobile phone applications.  · Taking medicines to help you quit smoking. (If you are pregnant or breastfeeding, talk with your health care provider first.) Some medicines contain nicotine and some do not. Both types of medicines help with cravings, but the medicines that include nicotine help to relieve withdrawal symptoms. Your health care provider may recommend:  ¨ Nicotine patches, gum, or lozenges.  ¨ Nicotine inhalers or sprays.  ¨ Non-nicotine medicine that is taken by mouth.  Talk with your health care provider about combining strategies, such as taking medicines while you are also receiving in-person counseling. Using these two strategies together makes you more likely to succeed in quitting than if you used either strategy on its own.  If you are pregnant or breastfeeding, talk with your health care provider about finding counseling or other support strategies to quit smoking. Do not take medicine to help you quit smoking unless told to do so by your health care  provider.  What things can I do to make it easier to quit?  Quitting smoking might feel overwhelming at first, but there is a lot that you can do to make it easier. Take these important actions:  · Reach out to your family and friends and ask that they support and encourage you during this time. Call telephone quitlines, reach out to support groups, or work with a counselor for support.  · Ask people who smoke to avoid smoking around you.  · Avoid places that trigger you to smoke, such as bars, parties, or smoke-break areas at work.  · Spend time around people who do not smoke.  · Lessen stress in your life, because stress can be a smoking trigger for some people. To lessen stress, try:  ¨ Exercising regularly.  ¨ Deep-breathing exercises.  ¨ Yoga.  ¨ Meditating.  ¨ Performing a body scan. This involves closing your eyes, scanning your body from head to toe, and noticing which parts of your body are particularly tense. Purposefully relax the muscles in those areas.  · Download or purchase mobile phone or tablet apps (applications) that can help you stick to your quit plan by providing reminders, tips, and encouragement. There are many free apps, such as QuitGuide from the CDC (Centers for Disease Control and Prevention). You can find other support for quitting smoking (smoking cessation) through smokefree.gov and other websites.  How will I feel when I quit smoking?  Within the first 24 hours of quitting smoking, you may start to feel some withdrawal symptoms. These symptoms are usually most noticeable 2-3 days after quitting, but they usually do not last beyond 2-3 weeks. Changes or symptoms that you might experience include:  · Mood swings.  · Restlessness, anxiety, or irritation.  · Difficulty concentrating.  · Dizziness.  · Strong cravings for sugary foods in addition to nicotine.  · Mild weight gain.  · Constipation.  · Nausea.  · Coughing or a sore throat.  · Changes in how your medicines work in your  body.  · A depressed mood.  · Difficulty sleeping (insomnia).  After the first 2-3 weeks of quitting, you may start to notice more positive results, such as:  · Improved sense of smell and taste.  · Decreased coughing and sore throat.  · Slower heart rate.  · Lower blood pressure.  · Clearer skin.  · The ability to breathe more easily.  · Fewer sick days.  Quitting smoking is very challenging for most people. Do not get discouraged if you are not successful the first time. Some people need to make many attempts to quit before they achieve long-term success. Do your best to stick to your quit plan, and talk with your health care provider if you have any questions or concerns.  This information is not intended to replace advice given to you by your health care provider. Make sure you discuss any questions you have with your health care provider.  Document Released: 12/12/2002 Document Revised: 08/15/2017 Document Reviewed: 05/03/2016  [employer] Interactive Patient Education © 2017 [employer]

## 2019-12-06 NOTE — PROGRESS NOTES
Chief Complaint   Patient presents with   • Cough       HISTORY OF PRESENT ILLNESS: Patient is a 54 y.o. female who presents today because she was seen a week ago with a cough and was prescribed an antibiotic and contingency as well as a steroid Dosepak.  She started a steroid Dosepak and within a couple days she did not feel any better so she did start the antibiotic 2 days ago and has been wheezing and coughing.  She also thinks she has a UTI as she has right flank pain and dysuria over the last couple days    Patient Active Problem List    Diagnosis Date Noted   • Vascular insufficiency 07/05/2019   • Hemorrhoids 07/05/2019   • Health care maintenance 03/13/2018   • Obesity (BMI 30-39.9) 03/13/2018   • Postmenopausal 03/13/2018   • Major depression, single episode 10/04/2013   • Essential hypertension, benign 02/26/2013   • Vitamin D deficiency 05/23/2011   • Tobacco use disorder 02/01/2010   • Mixed hyperlipidemia        Allergies:Patient has no known allergies.    Current Outpatient Medications Ordered in Epic   Medication Sig Dispense Refill   • albuterol 108 (90 Base) MCG/ACT Aero Soln inhalation aerosol Inhale 2 Puffs by mouth every four hours as needed. 1 Inhaler 0   • sulfamethoxazole-trimethoprim (BACTRIM DS) 800-160 MG tablet Take 1 Tab by mouth every 12 hours for 7 days. 14 Tab 0   • predniSONE (DELTASONE) 10 MG Tab 50 mg x 1 day; 40 mg x 1 day; 30 mg x 1 day; 20 mg x 1 day; 10 mg x 1 day 1 Quantity Sufficient 0   • azithromycin (ZITHROMAX) 250 MG Tab Use as package directs 6 Tab 0   • simvastatin (ZOCOR) 20 MG Tab TAKE 1 TABLET BY MOUTH IN THE EVENING 90 Tab 1   • escitalopram (LEXAPRO) 10 MG Tab TAKE 1 TABLET BY MOUTH ONCE DAILY 90 Tab 1   • metoprolol SR (TOPROL XL) 100 MG TABLET SR 24 HR Take 2 Tabs by mouth every day. 180 Tab 1   • Cholecalciferol (VITAMIN D3) 1000 units Cap Take  by mouth.     • hydrochlorothiazide (MICROZIDE) 12.5 MG capsule Take one tab daily as needed for leg swelling. 90 Cap 0    • guaiFENesin LA (MUCINEX) 600 MG TABLET SR 12 HR Take 1 Tab by mouth every 12 hours. 28 Tab 0   • fluticasone (FLONASE) 50 MCG/ACT nasal spray Spray 1 Spray in nose every day. (Patient not taking: Reported on 2019) 16 g 0   • albuterol 108 (90 BASE) MCG/ACT Aero Soln inhalation aerosol Inhale 2 Puffs by mouth every four hours as needed for Shortness of Breath. 1 Inhaler 1   • aspirin EC (ECOTRIN) 81 MG Tablet Delayed Response Take 81 mg by mouth every day.       No current [facility]-ordered facility-administered medications on file.        Past Medical History:   Diagnosis Date   • Bone island 2011   • Bone island 2011   • Elevated BP 10/5/2009   • Essential hypertension, benign 2013   • Major depression, single episode 10/4/2013   • Mixed hyperlipidemia 10/5/09    uncontrolled   • Premature menopause 10/5/2009   • Tobacco use disorder 2010   • Unspecified vitamin D deficiency 2011       Social History     Tobacco Use   • Smoking status: Current Every Day Smoker     Packs/day: 1.00     Years: 38.00     Pack years: 38.00     Types: Cigarettes   • Smokeless tobacco: Never Used   Substance Use Topics   • Alcohol use: No   • Drug use: No       Family Status   Relation Name Status   • Mo          hemorrhagic bleed due to anuerysm   • Fa          myocardial infarction     Family History   Problem Relation Age of Onset   • Hypertension Father    • Heart Disease Father        ROS:  Review of Systems   Constitutional: Negative for fever, chills, weight loss and malaise/fatigue.   HENT: Negative for ear pain, nosebleeds, congestion, sore throat and neck pain.    Eyes: Negative for blurred vision.   Respiratory: Positive for cough, sputum production, shortness of breath and wheezing.    Cardiovascular: Negative for chest pain, palpitations, orthopnea and leg swelling.   Gastrointestinal: Negative for heartburn, nausea, vomiting and abdominal pain.   Genitourinary: Positive for  "dysuria, urgency and frequency.     Exam:  /84 (BP Location: Right arm, Patient Position: Sitting, BP Cuff Size: Adult)   Pulse 80   Temp 36.9 °C (98.4 °F) (Oral)   Resp 16   Ht 1.575 m (5' 2\")   Wt 84.4 kg (186 lb)   SpO2 96%   General:  Well nourished, well developed female in NAD  Head:Normocephalic, atraumatic  Eyes: PERRLA, EOM within normal limits, no conjunctival injection, no scleral icterus, visual fields and acuity grossly intact.  Ears: Normal shape and symmetry, no tenderness, no discharge. External canals are without any significant edema or erythema. Tympanic membranes are without any inflammation, no effusion. Gross auditory acuity is intact  Nose: Symmetrical without tenderness, no discharge.  Mouth: reasonable hygiene, no erythema exudates or tonsillar enlargement.  Neck: no masses, range of motion within normal limits, no tracheal deviation. No obvious thyroid enlargement.  Pulmonary: chest is symmetrical with respiration, scattered wheezes and rhonchi bilaterally, not clearing with cough cardiovascular: regular rate and rhythm without murmurs, rubs, or gallops.  Extremities: no clubbing, cyanosis, or edema.    Urinalysis has blood, protein and leuks    Please note that this dictation was created using voice recognition software. I have made every reasonable attempt to correct obvious errors, but I expect that there are errors of grammar and possibly content that I did not discover before finalizing the note.    Assessment/Plan:  1. Wheezing-associated respiratory infection (WARI)  albuterol 108 (90 Base) MCG/ACT Aero Soln inhalation aerosol   2. Acute cystitis with hematuria  POCT Urinalysis    sulfamethoxazole-trimethoprim (BACTRIM DS) 800-160 MG tablet    URINE CULTURE(NEW)       Followup with primary care in the next 7-10 days if not significantly improving, return to the urgent care or go to the emergency room sooner for any worsening of symptoms.       "

## 2019-12-06 NOTE — LETTER
December 6, 2019         Patient: Bryan Kearney   YOB: 1965   Date of Visit: 12/6/2019           To Whom it May Concern:    Bryan Kearney was seen in my clinic on 12/6/2019. She may return to work on 12/8/2019, please excuse any recent absences.    If you have any questions or concerns, please don't hesitate to call.        Sincerely,           El Ramos P.A.-C.  Electronically Signed

## 2019-12-07 DIAGNOSIS — N30.01 ACUTE CYSTITIS WITH HEMATURIA: ICD-10-CM

## 2019-12-09 LAB
BACTERIA UR CULT: NORMAL
SIGNIFICANT IND 70042: NORMAL
SITE SITE: NORMAL
SOURCE SOURCE: NORMAL

## 2019-12-12 ENCOUNTER — OFFICE VISIT (OUTPATIENT)
Dept: URGENT CARE | Facility: PHYSICIAN GROUP | Age: 54
End: 2019-12-12
Payer: COMMERCIAL

## 2019-12-12 VITALS
OXYGEN SATURATION: 97 % | TEMPERATURE: 97.6 F | HEART RATE: 88 BPM | RESPIRATION RATE: 16 BRPM | WEIGHT: 186 LBS | SYSTOLIC BLOOD PRESSURE: 114 MMHG | HEIGHT: 62 IN | DIASTOLIC BLOOD PRESSURE: 80 MMHG | BODY MASS INDEX: 34.23 KG/M2

## 2019-12-12 DIAGNOSIS — J98.01 BRONCHOSPASM: ICD-10-CM

## 2019-12-12 PROCEDURE — 99214 OFFICE O/P EST MOD 30 MIN: CPT | Performed by: PHYSICIAN ASSISTANT

## 2019-12-12 RX ORDER — PREDNISONE 20 MG/1
TABLET ORAL
Qty: 10 TAB | Refills: 0 | Status: SHIPPED
Start: 2019-12-12 | End: 2020-03-21

## 2019-12-12 ASSESSMENT — ENCOUNTER SYMPTOMS
HEMOPTYSIS: 0
SHORTNESS OF BREATH: 1
PALPITATIONS: 0
FEVER: 0
WHEEZING: 1
SORE THROAT: 0
CHILLS: 0
COUGH: 1
SPUTUM PRODUCTION: 1

## 2019-12-12 NOTE — PROGRESS NOTES
Subjective:      Bryan Kearney is a 54 y.o. female who presents with UTI            Cough   This is a recurrent problem. The current episode started 1 to 4 weeks ago. The problem has been waxing and waning. The cough is non-productive. Associated symptoms include shortness of breath and wheezing. Pertinent negatives include no chest pain, chills, ear pain, fever, hemoptysis or sore throat. She has tried OTC cough suppressant for the symptoms.       Review of Systems   Constitutional: Positive for malaise/fatigue. Negative for chills and fever.   HENT: Negative for congestion, ear pain and sore throat.    Respiratory: Positive for cough, sputum production, shortness of breath and wheezing. Negative for hemoptysis.    Cardiovascular: Negative for chest pain and palpitations.   All other systems reviewed and are negative.    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  MEDS:   Current Outpatient Medications:   •  predniSONE (DELTASONE) 20 MG Tab, Take 40 mg by mouth once daily for 5 days., Disp: 10 Tab, Rfl: 0  •  albuterol 108 (90 Base) MCG/ACT Aero Soln inhalation aerosol, Inhale 2 Puffs by mouth every four hours as needed., Disp: 1 Inhaler, Rfl: 0  •  sulfamethoxazole-trimethoprim (BACTRIM DS) 800-160 MG tablet, Take 1 Tab by mouth every 12 hours for 7 days., Disp: 14 Tab, Rfl: 0  •  predniSONE (DELTASONE) 10 MG Tab, 50 mg x 1 day; 40 mg x 1 day; 30 mg x 1 day; 20 mg x 1 day; 10 mg x 1 day, Disp: 1 Quantity Sufficient, Rfl: 0  •  azithromycin (ZITHROMAX) 250 MG Tab, Use as package directs, Disp: 6 Tab, Rfl: 0  •  simvastatin (ZOCOR) 20 MG Tab, TAKE 1 TABLET BY MOUTH IN THE EVENING, Disp: 90 Tab, Rfl: 1  •   "escitalopram (LEXAPRO) 10 MG Tab, TAKE 1 TABLET BY MOUTH ONCE DAILY, Disp: 90 Tab, Rfl: 1  •  metoprolol SR (TOPROL XL) 100 MG TABLET SR 24 HR, Take 2 Tabs by mouth every day., Disp: 180 Tab, Rfl: 1  •  Cholecalciferol (VITAMIN D3) 1000 units Cap, Take  by mouth., Disp: , Rfl:   •  hydrochlorothiazide (MICROZIDE) 12.5 MG capsule, Take one tab daily as needed for leg swelling., Disp: 90 Cap, Rfl: 0  •  guaiFENesin LA (MUCINEX) 600 MG TABLET SR 12 HR, Take 1 Tab by mouth every 12 hours., Disp: 28 Tab, Rfl: 0  •  fluticasone (FLONASE) 50 MCG/ACT nasal spray, Spray 1 Spray in nose every day. (Patient not taking: Reported on 11/29/2019), Disp: 16 g, Rfl: 0  •  albuterol 108 (90 BASE) MCG/ACT Aero Soln inhalation aerosol, Inhale 2 Puffs by mouth every four hours as needed for Shortness of Breath., Disp: 1 Inhaler, Rfl: 1  •  aspirin EC (ECOTRIN) 81 MG Tablet Delayed Response, Take 81 mg by mouth every day., Disp: , Rfl:   ALLERGIES: No Known Allergies  SURGHX:   Past Surgical History:   Procedure Laterality Date   • SALPINGECTOMY  1999    right removed due to a tumor   • TONSILLECTOMY       SOCHX:  reports that she has been smoking cigarettes. She has a 38.00 pack-year smoking history. She has never used smokeless tobacco. She reports that she does not drink alcohol or use drugs.  FH: Family history was reviewed, no pertinent findings to report  Medications, Allergies, and current problem list reviewed today in Epic     Objective:     Blood Pressure 114/80 (BP Location: Right arm, Patient Position: Sitting, BP Cuff Size: Large adult)   Pulse 88   Temperature 36.4 °C (97.6 °F) (Temporal)   Respiration 16   Height 1.575 m (5' 2\")   Weight 84.4 kg (186 lb)   Oxygen Saturation 97%   Body Mass Index 34.02 kg/m²      Physical Exam  Vitals signs reviewed.   Constitutional:       General: She is not in acute distress.     Appearance: She is well-developed. She is not ill-appearing or toxic-appearing.      Interventions: " She is not intubated.  HENT:      Head: Normocephalic and atraumatic.      Right Ear: Hearing, tympanic membrane, ear canal and external ear normal.      Left Ear: Hearing, tympanic membrane, ear canal and external ear normal.      Nose: Nose normal.      Mouth/Throat:      Pharynx: Uvula midline.   Eyes:      General: Lids are normal.      Conjunctiva/sclera: Conjunctivae normal.   Neck:      Musculoskeletal: Full passive range of motion without pain, normal range of motion and neck supple.   Cardiovascular:      Rate and Rhythm: Regular rhythm.      Heart sounds: Normal heart sounds, S1 normal and S2 normal. No murmur. No friction rub. No gallop.    Pulmonary:      Effort: Pulmonary effort is normal. No tachypnea, bradypnea, accessory muscle usage or respiratory distress. She is not intubated.      Breath sounds: No stridor. Wheezing present. No decreased breath sounds, rhonchi or rales.   Chest:      Chest wall: No tenderness.   Musculoskeletal: Normal range of motion.   Skin:     General: Skin is warm and dry.   Neurological:      Mental Status: She is alert and oriented to person, place, and time.   Psychiatric:         Speech: Speech normal.         Behavior: Behavior normal.         Thought Content: Thought content normal.         Judgment: Judgment normal.                 Assessment/Plan:   Patient is a 54-year-old female who presents for evaluation of continuing cough.  She was seen in the urgent care 2 weeks ago for this problem and was prescribed antibiotics and inhaler.  She continues to have wheezing and shortness of breath.  Vital signs within normal limits.  Patient appears in no acute respiratory distress.  She has wheezes throughout all lung fields.  No other findings on exam.  Discussed she likely is experiencing bronchospasm.  We will put her on 5-day course of prednisone.  Work note given.    1. Bronchospasm  - work note  - predniSONE (DELTASONE) 20 MG Tab; Take 40 mg by mouth once daily for 5  days.  Dispense: 10 Tab; Refill: 0    Differential diagnosis, natural history, supportive care discussed. Follow-up with primary care provider within 7-10 days, emergency room precautions discussed.  Patient and/or family appears understanding of information.  Handout and review of patients diagnosis and treatment was discussed extensively.

## 2019-12-12 NOTE — LETTER
December 12, 2019         Patient: Bryan Kearney   YOB: 1965   Date of Visit: 12/12/2019           To Whom it May Concern:    Bryan Kearney was seen in my clinic on 12/12/2019. Please excuse her from work 12/5-12/13/2019.  She may return on 12/14/2019.   If you have any questions or concerns, please don't hesitate to call.        Sincerely,           Adan Hanks P.A.-C.  Electronically Signed

## 2020-03-21 ENCOUNTER — OFFICE VISIT (OUTPATIENT)
Dept: URGENT CARE | Facility: PHYSICIAN GROUP | Age: 55
End: 2020-03-21
Payer: COMMERCIAL

## 2020-03-21 VITALS
WEIGHT: 186 LBS | TEMPERATURE: 98.7 F | RESPIRATION RATE: 16 BRPM | DIASTOLIC BLOOD PRESSURE: 72 MMHG | HEIGHT: 62 IN | OXYGEN SATURATION: 97 % | SYSTOLIC BLOOD PRESSURE: 120 MMHG | BODY MASS INDEX: 34.23 KG/M2 | HEART RATE: 63 BPM

## 2020-03-21 DIAGNOSIS — H69.93 DYSFUNCTION OF BOTH EUSTACHIAN TUBES: ICD-10-CM

## 2020-03-21 PROCEDURE — 99213 OFFICE O/P EST LOW 20 MIN: CPT | Performed by: PHYSICIAN ASSISTANT

## 2020-03-21 NOTE — PROGRESS NOTES
Chief Complaint   Patient presents with   • Sinus Problem       HISTORY OF PRESENT ILLNESS: Patient is a 54 y.o. female who presents today for the following:    Patient comes in for evaluation of pressure behind her eyes over the last week.  She reports only clear nasal drainage and has not any fever, sore throat, ear pain, cough.  She has been taking Advil that temporarily helps with the headache.  She denies recent travel outside of the immediate area.    Patient Active Problem List    Diagnosis Date Noted   • Vascular insufficiency 07/05/2019   • Hemorrhoids 07/05/2019   • Health care maintenance 03/13/2018   • Obesity (BMI 30-39.9) 03/13/2018   • Postmenopausal 03/13/2018   • Major depression, single episode 10/04/2013   • Essential hypertension, benign 02/26/2013   • Vitamin D deficiency 05/23/2011   • Tobacco use disorder 02/01/2010   • Mixed hyperlipidemia        Allergies:Patient has no known allergies.    Current Outpatient Medications Ordered in Epic   Medication Sig Dispense Refill   • albuterol 108 (90 Base) MCG/ACT Aero Soln inhalation aerosol Inhale 2 Puffs by mouth every four hours as needed. 1 Inhaler 0   • simvastatin (ZOCOR) 20 MG Tab TAKE 1 TABLET BY MOUTH IN THE EVENING 90 Tab 1   • escitalopram (LEXAPRO) 10 MG Tab TAKE 1 TABLET BY MOUTH ONCE DAILY 90 Tab 1   • metoprolol SR (TOPROL XL) 100 MG TABLET SR 24 HR Take 2 Tabs by mouth every day. 180 Tab 1   • Cholecalciferol (VITAMIN D3) 1000 units Cap Take  by mouth.     • hydrochlorothiazide (MICROZIDE) 12.5 MG capsule Take one tab daily as needed for leg swelling. 90 Cap 0   • guaiFENesin LA (MUCINEX) 600 MG TABLET SR 12 HR Take 1 Tab by mouth every 12 hours. 28 Tab 0   • albuterol 108 (90 BASE) MCG/ACT Aero Soln inhalation aerosol Inhale 2 Puffs by mouth every four hours as needed for Shortness of Breath. 1 Inhaler 1   • aspirin EC (ECOTRIN) 81 MG Tablet Delayed Response Take 81 mg by mouth every day.       No current Epic-ordered  "facility-administered medications on file.        Past Medical History:   Diagnosis Date   • Bone island 2011   • Bone island 2011   • Elevated BP 10/5/2009   • Essential hypertension, benign 2013   • Major depression, single episode 10/4/2013   • Mixed hyperlipidemia 10/5/09    uncontrolled   • Premature menopause 10/5/2009   • Tobacco use disorder 2010   • Unspecified vitamin D deficiency 2011       Social History     Tobacco Use   • Smoking status: Current Every Day Smoker     Packs/day: 1.00     Years: 38.00     Pack years: 38.00     Types: Cigarettes   • Smokeless tobacco: Never Used   Substance Use Topics   • Alcohol use: No   • Drug use: No       Family Status   Relation Name Status   • Mo          hemorrhagic bleed due to anuerysm   • Fa          myocardial infarction     Family History   Problem Relation Age of Onset   • Hypertension Father    • Heart Disease Father        Review of Systems:   Constitutional ROS: No unexpected change in weight, No weakness, No fatigue  Eye ROS: No recent significant change in vision, No eye pain, redness, discharge  Ear ROS: No drainage, No tinnitus or vertigo, No recent change in hearing  Mouth/Throat ROS: No teeth or gum problems, No bleeding gums, No tongue complaints  Neck ROS: No swollen glands, No significant pain in neck  Pulmonary ROS: No chronic cough, sputum, or hemoptysis, No dyspnea on exertion, No wheezing  Cardiovascular ROS: No diaphoresis, No edema, No palpitations  Musculoskeletal/Extremities ROS: No peripheral edema, No pain, redness or swelling on the joints  Hematologic/Lymphatic ROS: No chills, No night sweats, No weight loss  Skin/Integumentary ROS: No edema, No evidence of rash, No itching      Exam:  /72   Pulse 63   Temp 37.1 °C (98.7 °F) (Temporal)   Resp 16   Ht 1.575 m (5' 2\")   Wt 84.4 kg (186 lb)   SpO2 97%   General: Well developed, well nourished. No distress.    Eye: PERRL/EOMI; " conjunctivae clear, lids normal.  ENMT: Lips without lesions, MMM. Oropharynx is clear. Bilateral TMs are within normal limits with serous effusions bilaterally..  Pulmonary: Unlabored respiratory effort. Lungs clear to auscultation, no wheezes, no rhonchi.    Cardiovascular: Regular rate and rhythm without murmur.   Neurologic: Grossly nonfocal. No facial asymmetry noted.  Lymph: No cervical lymphadenopathy noted.  Skin: Warm, dry, good turgor. No rashes in visible areas.   Psych: Normal mood. Alert and oriented to person, place and time.      Assessment/Plan:  Question if patient symptoms are coming from seasonal allergies.  Discussed appropriate over-the-counter symptomatic medication, and when to return to clinic. Follow up for worsening or persistent symptoms.  1. Dysfunction of both eustachian tubes

## 2020-03-21 NOTE — LETTER
March 21, 2020         Patient: Bryan Kearney   YOB: 1965   Date of Visit: 3/21/2020           To Whom it May Concern:    Bryan Kearney was seen in my clinic on 3/21/2020. She may return to work on her next regularly scheduled shift. Please excuse her for missing last night and tonight.    If you have any questions or concerns, please don't hesitate to call.        Sincerely,           Cara Fitzgerald P.A.-C.  Electronically Signed

## 2020-06-13 DIAGNOSIS — E78.2 MIXED HYPERLIPIDEMIA: ICD-10-CM

## 2020-06-13 DIAGNOSIS — I10 ESSENTIAL HYPERTENSION, BENIGN: ICD-10-CM

## 2020-06-13 DIAGNOSIS — R51.9 FREQUENT HEADACHES: ICD-10-CM

## 2020-06-15 RX ORDER — SIMVASTATIN 20 MG
TABLET ORAL
Qty: 90 TAB | Refills: 1 | Status: SHIPPED | OUTPATIENT
Start: 2020-06-15 | End: 2021-01-08

## 2020-06-15 RX ORDER — METOPROLOL SUCCINATE 100 MG/1
TABLET, EXTENDED RELEASE ORAL
Qty: 180 TAB | Refills: 1 | Status: SHIPPED | OUTPATIENT
Start: 2020-06-15 | End: 2020-09-09 | Stop reason: SDUPTHER

## 2020-09-09 DIAGNOSIS — I10 ESSENTIAL HYPERTENSION, BENIGN: ICD-10-CM

## 2020-09-09 DIAGNOSIS — F32.1 MODERATE SINGLE CURRENT EPISODE OF MAJOR DEPRESSIVE DISORDER (HCC): ICD-10-CM

## 2020-09-09 DIAGNOSIS — R51.9 FREQUENT HEADACHES: ICD-10-CM

## 2020-09-09 RX ORDER — ESCITALOPRAM OXALATE 10 MG/1
TABLET ORAL
Qty: 90 TAB | Refills: 1 | OUTPATIENT
Start: 2020-09-09

## 2020-09-09 RX ORDER — METOPROLOL SUCCINATE 100 MG/1
TABLET, EXTENDED RELEASE ORAL
Qty: 180 TAB | Refills: 1 | Status: SHIPPED | OUTPATIENT
Start: 2020-09-09 | End: 2021-07-27

## 2020-09-09 RX ORDER — ESCITALOPRAM OXALATE 10 MG/1
TABLET ORAL
Qty: 90 TAB | Refills: 1 | Status: SHIPPED | OUTPATIENT
Start: 2020-09-09 | End: 2021-03-31

## 2020-09-09 NOTE — TELEPHONE ENCOUNTER
Received request via: Patient    Was the patient seen in the last year in this department NO    Does the patient have an active prescription (recently filled or refills available) for medication(s) requested? No

## 2020-09-15 ENCOUNTER — OFFICE VISIT (OUTPATIENT)
Dept: MEDICAL GROUP | Facility: PHYSICIAN GROUP | Age: 55
End: 2020-09-15
Payer: COMMERCIAL

## 2020-09-15 VITALS
SYSTOLIC BLOOD PRESSURE: 118 MMHG | DIASTOLIC BLOOD PRESSURE: 82 MMHG | HEIGHT: 63 IN | OXYGEN SATURATION: 97 % | WEIGHT: 195.7 LBS | BODY MASS INDEX: 34.68 KG/M2 | RESPIRATION RATE: 16 BRPM | TEMPERATURE: 97.5 F | HEART RATE: 57 BPM

## 2020-09-15 DIAGNOSIS — K64.9 HEMORRHOIDS, UNSPECIFIED HEMORRHOID TYPE: ICD-10-CM

## 2020-09-15 DIAGNOSIS — Z12.31 ENCOUNTER FOR SCREENING MAMMOGRAM FOR BREAST CANCER: ICD-10-CM

## 2020-09-15 DIAGNOSIS — E78.2 MIXED HYPERLIPIDEMIA: ICD-10-CM

## 2020-09-15 DIAGNOSIS — I99.8 VASCULAR INSUFFICIENCY: ICD-10-CM

## 2020-09-15 DIAGNOSIS — H61.22 IMPACTED CERUMEN, LEFT EAR: ICD-10-CM

## 2020-09-15 DIAGNOSIS — Z00.00 HEALTH CARE MAINTENANCE: ICD-10-CM

## 2020-09-15 DIAGNOSIS — E04.9 ENLARGED THYROID: ICD-10-CM

## 2020-09-15 DIAGNOSIS — I10 ESSENTIAL HYPERTENSION, BENIGN: ICD-10-CM

## 2020-09-15 DIAGNOSIS — F32.1 CURRENT MODERATE EPISODE OF MAJOR DEPRESSIVE DISORDER WITHOUT PRIOR EPISODE (HCC): ICD-10-CM

## 2020-09-15 DIAGNOSIS — Z98.890 HISTORY OF TYMPANOMASTOIDECTOMY: ICD-10-CM

## 2020-09-15 PROCEDURE — 99214 OFFICE O/P EST MOD 30 MIN: CPT | Performed by: NURSE PRACTITIONER

## 2020-09-15 ASSESSMENT — PATIENT HEALTH QUESTIONNAIRE - PHQ9
9. THOUGHTS THAT YOU WOULD BE BETTER OFF DEAD, OR OF HURTING YOURSELF: NOT AT ALL
6. FEELING BAD ABOUT YOURSELF - OR THAT YOU ARE A FAILURE OR HAVE LET YOURSELF OR YOUR FAMILY DOWN: SEVERAL DAYS
3. TROUBLE FALLING OR STAYING ASLEEP OR SLEEPING TOO MUCH: NEARLY EVERY DAY
1. LITTLE INTEREST OR PLEASURE IN DOING THINGS: SEVERAL DAYS
8. MOVING OR SPEAKING SO SLOWLY THAT OTHER PEOPLE COULD HAVE NOTICED. OR THE OPPOSITE, BEING SO FIGETY OR RESTLESS THAT YOU HAVE BEEN MOVING AROUND A LOT MORE THAN USUAL: NOT AT ALL
SUM OF ALL RESPONSES TO PHQ9 QUESTIONS 1 AND 2: 2
5. POOR APPETITE OR OVEREATING: SEVERAL DAYS
7. TROUBLE CONCENTRATING ON THINGS, SUCH AS READING THE NEWSPAPER OR WATCHING TELEVISION: NOT AT ALL
2. FEELING DOWN, DEPRESSED, IRRITABLE, OR HOPELESS: SEVERAL DAYS

## 2020-09-15 ASSESSMENT — PAIN SCALES - GENERAL: PAINLEVEL: NO PAIN

## 2020-09-15 NOTE — ASSESSMENT & PLAN NOTE
Chronic intermittent problem.  Tolerable.  Worse when working 12-hour shifts on her feet.  Tried hydroxyzine for a couple weeks, but did not help.  Wears compression stockings with some relief.  Denies chest pain, shortness of breath.

## 2020-09-15 NOTE — ASSESSMENT & PLAN NOTE
This is a chronic health problem that is well controlled with current medications and lifestyle measures.  Taking escitalopram 10 mg daily.  Doing well.  Has had some stress with granddaughter in and out of the hospital.  Continues to work at [employer].  Denies SI/HI.

## 2020-09-15 NOTE — ASSESSMENT & PLAN NOTE
Enlarged thyroid found on exam today.  Patient reports she has had enlarged thyroid for several years.  Reports previous imaging several years ago showed benign findings.  Reports size has not changed.  Denies hoarse voice or difficulty swallowing.  Will get updated thyroid panel.

## 2020-09-15 NOTE — ASSESSMENT & PLAN NOTE
Well-controlled.  Consulted with gastroenterology and has been working on lifestyle measures.  Denies any bleeding.

## 2020-09-15 NOTE — ASSESSMENT & PLAN NOTE
This is a chronic health problem that is well controlled with current medications and lifestyle measures.  Taking metoprolol 100 mg daily.  Tolerating medication, denies lightheadedness.  Does not exercise routinely.

## 2020-09-15 NOTE — PROGRESS NOTES
CC: Chronic health problems    HISTORY OF THE PRESENT ILLNESS: Patient is a 54 y.o. female. This pleasant patient is here today for evaluation and management of the following health problems.    Health Maintenance: Due for Pap, colonoscopy, mammogram.  Declines referral for colonoscopy.  Will consider later.      Essential hypertension, benign  This is a chronic health problem that is well controlled with current medications and lifestyle measures.  Taking metoprolol 100 mg daily.  Tolerating medication, denies lightheadedness.  Does not exercise routinely.      Mixed hyperlipidemia  Chronic health problem, uncertain control.  Taking simvastatin 20 mg daily.  LDL elevated a year ago.  Will recheck.  If LDL still elevated, will increase dose of simvastatin.  Discussed with patient.  Component      Latest Ref Rng & Units 11/26/2019           8:30 AM   Cholesterol,Tot      100 - 199 mg/dL 235 (H)   Triglycerides      0 - 149 mg/dL 166 (H)   HDL      >=40 mg/dL 62   LDL      <100 mg/dL 140 (H)         Major depression, single episode  This is a chronic health problem that is well controlled with current medications and lifestyle measures.  Taking escitalopram 10 mg daily.  Doing well.  Has had some stress with granddaughter in and out of the hospital.  Continues to work at [employer].  Denies SI/HI.      Hemorrhoids  Well-controlled.  Consulted with gastroenterology and has been working on lifestyle measures.  Denies any bleeding.        Vascular insufficiency  Chronic intermittent problem.  Tolerable.  Worse when working 12-hour shifts on her feet.  Tried hydroxyzine for a couple weeks, but did not help.  Wears compression stockings with some relief.  Denies chest pain, shortness of breath.    Health care maintenance  Due for Pap and mammogram.  Patient due for repeat colonoscopy, recall 5 years.  She is declining at this time.  Would like to do later.    Enlarged thyroid  Enlarged thyroid found on exam today.  Patient  reports she has had enlarged thyroid for several years.  Reports previous imaging several years ago showed benign findings.  Reports size has not changed.  Denies hoarse voice or difficulty swallowing.  Will get updated thyroid panel.    History of tympanomastoidectomy  History of tympanomastoidectomy as a child.  Patient has packed cerumen in left ear canal.  Will refer to ENT for ear lavage and evaluation and treatment.  Denies otalgia.  Does report intermittent severe itching in left ear canal.      Allergies: Benadryl allergy    Current Outpatient Medications Ordered in Epic   Medication Sig Dispense Refill   • metoprolol SR (TOPROL XL) 100 MG TABLET SR 24 HR Take 2 tablets by mouth once daily 180 Tab 1   • escitalopram (LEXAPRO) 10 MG Tab TAKE 1 TABLET BY MOUTH ONCE DAILY 90 Tab 1   • simvastatin (ZOCOR) 20 MG Tab Take 1 tablet by mouth in the evening 90 Tab 1   • albuterol 108 (90 Base) MCG/ACT Aero Soln inhalation aerosol Inhale 2 Puffs by mouth every four hours as needed. 1 Inhaler 0   • guaiFENesin LA (MUCINEX) 600 MG TABLET SR 12 HR Take 1 Tab by mouth every 12 hours. 28 Tab 0   • albuterol 108 (90 BASE) MCG/ACT Aero Soln inhalation aerosol Inhale 2 Puffs by mouth every four hours as needed for Shortness of Breath. 1 Inhaler 1   • aspirin EC (ECOTRIN) 81 MG Tablet Delayed Response Take 81 mg by mouth every day.     • Cholecalciferol (VITAMIN D3) 1000 units Cap Take  by mouth.       No current Epic-ordered facility-administered medications on file.        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   • Bone island 5/23/2011   • Elevated BP 10/5/2009   • Essential hypertension, benign 2/26/2013   • Major depression, single episode 10/4/2013   • Mixed hyperlipidemia 10/5/09    uncontrolled   • Premature menopause 10/5/2009   • Tobacco use disorder 2/1/2010   • Unspecified vitamin D deficiency 5/23/2011       Past Surgical History:   Procedure Laterality Date   • SALPINGECTOMY  1999     "right removed due to a tumor   • TONSILLECTOMY     • TYMPANOMASTOIDECTOMY Left        Social History     Tobacco Use   • Smoking status: Current Every Day Smoker     Packs/day: 1.00     Years: 38.00     Pack years: 38.00     Types: Cigarettes   • Smokeless tobacco: Never Used   Substance Use Topics   • Alcohol use: No   • Drug use: No       Family History   Problem Relation Age of Onset   • Hypertension Father    • Heart Disease Father        ROS:   As in HPI, otherwise negative for chest pain, dyspnea, abdominal pain, dysuria, blood in stool, fever         Exam: /82   Pulse (!) 57   Temp 36.4 °C (97.5 °F) (Temporal)   Resp 16   Ht 1.6 m (5' 3\")   Wt 88.8 kg (195 lb 11.2 oz)   SpO2 97%  Body mass index is 34.67 kg/m².    General: Alert, pleasant, well nourished, well developed female in NAD  HEENT: Normocephalic. Eyes conjunctiva clear lids without ptosis, pupils equal and reactive to light, ears normal shape and contour, canals are clear bilaterally, right tympanic membranes are pearly gray with good light reflex.  Left ear canal with dark brown impacted cerumen., nasal mucosa without erythema and drainage, oropharynx is without erythema, edema or exudates.   Neck: Supple without bruit. Thyroid is enlarged.  Pulmonary: Clear to ausculation.  Normal effort. No rales, ronchi, or wheezing.  Cardiovascular: Normal rate and rhythm without murmur. Carotid and radial pulses are intact and equal bilaterally.  No lower extremity edema.  Abdomen: Soft, nontender, nondistended. Normal bowel sounds. Liver and spleen are not palpable  Neurologic: Grossly nonfocal  Lymph: No cervical or supraclavicular lymph nodes are palpable  Skin: Warm and dry.    Musculoskeletal: Normal gait.   Psych: Normal mood and affect. Alert and oriented. Judgment and insight is normal.    Please note that this dictation was created using voice recognition software. I have made every reasonable attempt to correct obvious errors, but I " expect that there are errors of grammar and possibly content that I did not discover before finalizing the note.      Assessment/Plan  1. Essential hypertension, benign  Continue with metoprolol, no changes.  Reviewed lifestyle measures including routine aerobic exercise.  - Comp Metabolic Panel; Future  - ESTIMATED GFR; Future  - CBC WITHOUT DIFFERENTIAL; Future    2. Mixed hyperlipidemia  Continue with simvastatin.  Will get updated lipid profile.  We will notify patient of lab results.  Consider increasing dose of simvastatin pending results.  - Lipid Profile; Future    3. Current moderate episode of major depressive disorder without prior episode (HCC)  Continue with escitalopram.  Advised on routine aerobic exercise.    4. Hemorrhoids, unspecified hemorrhoid type  Resolved.    5. Encounter for screening mammogram for breast cancer  Ordered.  - MA-SCREENING MAMMO BILAT W/TOMOSYNTHESIS W/CAD; Future    6. Enlarged thyroid  Enlarged thyroid on exam today.  Patient reports previous ultrasounds normal.  Will get thyroid panel.  - THYROID PEROXIDASE  (TPO) AB; Future  - TSH; Future  - FREE THYROXINE; Future    7. History of tympanomastoidectomy  Patient has history of tympanomastoidectomy as a child, left side.  Has impacted cerumen in left ear canal.  Will refer to ENT.  - REFERRAL TO ENT    8. Impacted cerumen, left ear    - REFERRAL TO ENT    9. Vascular insufficiency  Continue with lifestyle measures.    10. Health care maintenance      Patient will return to clinic in 6 weeks for Pap smear.

## 2020-09-15 NOTE — ASSESSMENT & PLAN NOTE
History of tympanomastoidectomy as a child.  Patient has packed cerumen in left ear canal.  Will refer to ENT for ear lavage and evaluation and treatment.  Denies otalgia.  Does report intermittent severe itching in left ear canal.

## 2020-09-15 NOTE — ASSESSMENT & PLAN NOTE
Due for Pap and mammogram.  Patient due for repeat colonoscopy, recall 5 years.  She is declining at this time.  Would like to do later.

## 2020-09-15 NOTE — ASSESSMENT & PLAN NOTE
Chronic health problem, uncertain control.  Taking simvastatin 20 mg daily.  LDL elevated a year ago.  Will recheck.  If LDL still elevated, will increase dose of simvastatin.  Discussed with patient.  Component      Latest Ref Rng & Units 11/26/2019           8:30 AM   Cholesterol,Tot      100 - 199 mg/dL 235 (H)   Triglycerides      0 - 149 mg/dL 166 (H)   HDL      >=40 mg/dL 62   LDL      <100 mg/dL 140 (H)

## 2020-11-16 ENCOUNTER — HOSPITAL ENCOUNTER (OUTPATIENT)
Dept: LAB | Facility: MEDICAL CENTER | Age: 55
End: 2020-11-16
Attending: NURSE PRACTITIONER
Payer: COMMERCIAL

## 2020-11-16 DIAGNOSIS — I10 ESSENTIAL HYPERTENSION, BENIGN: ICD-10-CM

## 2020-11-16 DIAGNOSIS — E04.9 ENLARGED THYROID: ICD-10-CM

## 2020-11-16 DIAGNOSIS — E78.2 MIXED HYPERLIPIDEMIA: ICD-10-CM

## 2020-11-16 LAB
ALBUMIN SERPL BCP-MCNC: 4.5 G/DL (ref 3.2–4.9)
ALBUMIN/GLOB SERPL: 1.8 G/DL
ALP SERPL-CCNC: 90 U/L (ref 30–99)
ALT SERPL-CCNC: 30 U/L (ref 2–50)
ANION GAP SERPL CALC-SCNC: 10 MMOL/L (ref 7–16)
AST SERPL-CCNC: 28 U/L (ref 12–45)
BILIRUB SERPL-MCNC: 0.5 MG/DL (ref 0.1–1.5)
BUN SERPL-MCNC: 12 MG/DL (ref 8–22)
CALCIUM SERPL-MCNC: 9.7 MG/DL (ref 8.5–10.5)
CHLORIDE SERPL-SCNC: 105 MMOL/L (ref 96–112)
CHOLEST SERPL-MCNC: 217 MG/DL (ref 100–199)
CO2 SERPL-SCNC: 28 MMOL/L (ref 20–33)
CREAT SERPL-MCNC: 0.89 MG/DL (ref 0.5–1.4)
ERYTHROCYTE [DISTWIDTH] IN BLOOD BY AUTOMATED COUNT: 45.3 FL (ref 35.9–50)
FASTING STATUS PATIENT QL REPORTED: NORMAL
GLOBULIN SER CALC-MCNC: 2.5 G/DL (ref 1.9–3.5)
GLUCOSE SERPL-MCNC: 78 MG/DL (ref 65–99)
HCT VFR BLD AUTO: 44.2 % (ref 37–47)
HDLC SERPL-MCNC: 54 MG/DL
HGB BLD-MCNC: 14.4 G/DL (ref 12–16)
LDLC SERPL CALC-MCNC: 119 MG/DL
MCH RBC QN AUTO: 30.7 PG (ref 27–33)
MCHC RBC AUTO-ENTMCNC: 32.6 G/DL (ref 33.6–35)
MCV RBC AUTO: 94.2 FL (ref 81.4–97.8)
PLATELET # BLD AUTO: 245 K/UL (ref 164–446)
PMV BLD AUTO: 10.3 FL (ref 9–12.9)
POTASSIUM SERPL-SCNC: 3.9 MMOL/L (ref 3.6–5.5)
PROT SERPL-MCNC: 7 G/DL (ref 6–8.2)
RBC # BLD AUTO: 4.69 M/UL (ref 4.2–5.4)
SODIUM SERPL-SCNC: 143 MMOL/L (ref 135–145)
T4 FREE SERPL-MCNC: 1.04 NG/DL (ref 0.93–1.7)
THYROPEROXIDASE AB SERPL-ACNC: 176 IU/ML (ref 0–9)
TRIGL SERPL-MCNC: 222 MG/DL (ref 0–149)
TSH SERPL DL<=0.005 MIU/L-ACNC: 2 UIU/ML (ref 0.38–5.33)
WBC # BLD AUTO: 7.1 K/UL (ref 4.8–10.8)

## 2020-11-16 PROCEDURE — 84443 ASSAY THYROID STIM HORMONE: CPT

## 2020-11-16 PROCEDURE — 85027 COMPLETE CBC AUTOMATED: CPT

## 2020-11-16 PROCEDURE — 84439 ASSAY OF FREE THYROXINE: CPT

## 2020-11-16 PROCEDURE — 80061 LIPID PANEL: CPT

## 2020-11-16 PROCEDURE — 36415 COLL VENOUS BLD VENIPUNCTURE: CPT

## 2020-11-16 PROCEDURE — 80053 COMPREHEN METABOLIC PANEL: CPT

## 2020-11-16 PROCEDURE — 86376 MICROSOMAL ANTIBODY EACH: CPT

## 2020-12-21 ENCOUNTER — APPOINTMENT (OUTPATIENT)
Dept: RADIOLOGY | Facility: MEDICAL CENTER | Age: 55
End: 2020-12-21
Attending: NURSE PRACTITIONER
Payer: COMMERCIAL

## 2021-01-06 DIAGNOSIS — E78.2 MIXED HYPERLIPIDEMIA: ICD-10-CM

## 2021-01-08 RX ORDER — SIMVASTATIN 20 MG
TABLET ORAL
Qty: 90 TAB | Refills: 3 | Status: SHIPPED | OUTPATIENT
Start: 2021-01-08 | End: 2021-10-26 | Stop reason: SDUPTHER

## 2021-02-03 ENCOUNTER — OFFICE VISIT (OUTPATIENT)
Dept: URGENT CARE | Facility: PHYSICIAN GROUP | Age: 56
End: 2021-02-03
Payer: COMMERCIAL

## 2021-02-03 ENCOUNTER — HOSPITAL ENCOUNTER (OUTPATIENT)
Facility: MEDICAL CENTER | Age: 56
End: 2021-02-03
Attending: PHYSICIAN ASSISTANT
Payer: COMMERCIAL

## 2021-02-03 VITALS
SYSTOLIC BLOOD PRESSURE: 118 MMHG | HEIGHT: 63 IN | TEMPERATURE: 98.4 F | WEIGHT: 197 LBS | DIASTOLIC BLOOD PRESSURE: 84 MMHG | RESPIRATION RATE: 20 BRPM | OXYGEN SATURATION: 96 % | HEART RATE: 68 BPM | BODY MASS INDEX: 34.91 KG/M2

## 2021-02-03 DIAGNOSIS — J06.9 UPPER RESPIRATORY TRACT INFECTION, UNSPECIFIED TYPE: ICD-10-CM

## 2021-02-03 LAB — COVID ORDER STATUS COVID19: NORMAL

## 2021-02-03 PROCEDURE — U0005 INFEC AGEN DETEC AMPLI PROBE: HCPCS

## 2021-02-03 PROCEDURE — 99214 OFFICE O/P EST MOD 30 MIN: CPT | Performed by: PHYSICIAN ASSISTANT

## 2021-02-03 PROCEDURE — 99000 SPECIMEN HANDLING OFFICE-LAB: CPT | Performed by: PHYSICIAN ASSISTANT

## 2021-02-03 PROCEDURE — U0003 INFECTIOUS AGENT DETECTION BY NUCLEIC ACID (DNA OR RNA); SEVERE ACUTE RESPIRATORY SYNDROME CORONAVIRUS 2 (SARS-COV-2) (CORONAVIRUS DISEASE [COVID-19]), AMPLIFIED PROBE TECHNIQUE, MAKING USE OF HIGH THROUGHPUT TECHNOLOGIES AS DESCRIBED BY CMS-2020-01-R: HCPCS

## 2021-02-03 ASSESSMENT — FIBROSIS 4 INDEX: FIB4 SCORE: 1.17

## 2021-02-03 NOTE — PROGRESS NOTES
Chief Complaint   Patient presents with   • Cough     fatigue   • Pharyngitis       HISTORY OF PRESENT ILLNESS: Patient is a 55 y.o. female who presents today because 2 to 3-day history of body aches, fatigue, cough and sore throat.  She tried some DayQuil, NyQuil, ibuprofen for symptoms with only minimal improvement if any.    Patient Active Problem List    Diagnosis Date Noted   • Enlarged thyroid 09/15/2020   • History of tympanomastoidectomy 09/15/2020   • Vascular insufficiency 07/05/2019   • Hemorrhoids 07/05/2019   • Health care maintenance 03/13/2018   • Obesity (BMI 30-39.9) 03/13/2018   • Postmenopausal 03/13/2018   • Major depression, single episode 10/04/2013   • Essential hypertension, benign 02/26/2013   • Vitamin D deficiency 05/23/2011   • Tobacco use disorder 02/01/2010   • Mixed hyperlipidemia        Allergies:Benadryl allergy    Current Outpatient Medications Ordered in Epic   Medication Sig Dispense Refill   • simvastatin (ZOCOR) 20 MG Tab Take 1 tablet by mouth in the evening 90 Tab 3   • metoprolol SR (TOPROL XL) 100 MG TABLET SR 24 HR Take 2 tablets by mouth once daily 180 Tab 1   • escitalopram (LEXAPRO) 10 MG Tab TAKE 1 TABLET BY MOUTH ONCE DAILY 90 Tab 1   • albuterol 108 (90 Base) MCG/ACT Aero Soln inhalation aerosol Inhale 2 Puffs by mouth every four hours as needed. 1 Inhaler 0   • Cholecalciferol (VITAMIN D3) 1000 units Cap Take  by mouth.     • guaiFENesin LA (MUCINEX) 600 MG TABLET SR 12 HR Take 1 Tab by mouth every 12 hours. 28 Tab 0   • albuterol 108 (90 BASE) MCG/ACT Aero Soln inhalation aerosol Inhale 2 Puffs by mouth every four hours as needed for Shortness of Breath. 1 Inhaler 1   • aspirin EC (ECOTRIN) 81 MG Tablet Delayed Response Take 81 mg by mouth every day.       No current [facility]-ordered facility-administered medications on file.        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   • Bone island 5/23/2011   • Elevated BP 10/5/2009   • Essential  "hypertension, benign 2013   • Major depression, single episode 10/4/2013   • Mixed hyperlipidemia 10/5/09    uncontrolled   • Premature menopause 10/5/2009   • Tobacco use disorder 2010   • Unspecified vitamin D deficiency 2011       Social History     Tobacco Use   • Smoking status: Current Every Day Smoker     Packs/day: 1.00     Years: 38.00     Pack years: 38.00     Types: Cigarettes   • Smokeless tobacco: Never Used   Substance Use Topics   • Alcohol use: No   • Drug use: No       Family Status   Relation Name Status   • Mo          hemorrhagic bleed due to anuerysm   • Fa          myocardial infarction     Family History   Problem Relation Age of Onset   • Hypertension Father    • Heart Disease Father        ROS:  Review of Systems   Constitutional: Negative for fever, chills, positive for myalgias and malaise/fatigue.   HENT: Negative for ear pain, nosebleeds, congestion, positive for sore throat and no neck pain.    Eyes: Negative for blurred vision.   Respiratory: Positive for cough, no sputum production, shortness of breath and wheezing.    Cardiovascular: Negative for chest pain, palpitations, orthopnea and leg swelling.   Gastrointestinal: Negative for heartburn, nausea, vomiting and abdominal pain.   Genitourinary: Negative for dysuria, urgency and frequency.     Exam:  /84 (BP Location: Right arm, Patient Position: Sitting, BP Cuff Size: Large adult)   Pulse 68   Temp 36.9 °C (98.4 °F) (Temporal)   Resp 20   Ht 1.6 m (5' 3\")   Wt 89.4 kg (197 lb)   SpO2 96%   General:  Well nourished, well developed female in NAD  Head:Normocephalic, atraumatic  Eyes: PERRLA, EOM within normal limits, no conjunctival injection, no scleral icterus, visual fields and acuity grossly intact.  Ears: Normal shape and symmetry, no tenderness, no discharge. External canals are without any significant edema or erythema. Tympanic membranes are without any inflammation, no effusion. Gross " auditory acuity is intact  Nose: Symmetrical without tenderness, no discharge.  Mouth: reasonable hygiene, no erythema exudates or tonsillar enlargement.  Neck: no masses, range of motion within normal limits, no tracheal deviation. No obvious thyroid enlargement.  Pulmonary: chest is symmetrical with respiration, no wheezes, crackles, or rhonchi.  Cardiovascular: regular rate and rhythm without murmurs, rubs, or gallops.  Extremities: no clubbing, cyanosis, or edema.    Please note that this dictation was created using voice recognition software. I have made every reasonable attempt to correct obvious errors, but I expect that there are errors of grammar and possibly content that I did not discover before finalizing the note.    Assessment/Plan:  1. Upper respiratory tract infection, unspecified type  SARS-CoV-2 PCR (24 hour In-House): Collect NP swab in VTM   Discussed over-the-counter symptomatic relief as needed, strict isolation until Covid test returns    Followup with primary care in the next 7-10 days if not significantly improving, return to the urgent care or go to the emergency room sooner for any worsening of symptoms.

## 2021-02-04 LAB
SARS-COV-2 RNA RESP QL NAA+PROBE: NOTDETECTED
SPECIMEN SOURCE: NORMAL

## 2021-02-05 ENCOUNTER — OFFICE VISIT (OUTPATIENT)
Dept: URGENT CARE | Facility: PHYSICIAN GROUP | Age: 56
End: 2021-02-05
Payer: COMMERCIAL

## 2021-02-05 ENCOUNTER — HOSPITAL ENCOUNTER (OUTPATIENT)
Facility: MEDICAL CENTER | Age: 56
End: 2021-02-05
Attending: PHYSICIAN ASSISTANT
Payer: COMMERCIAL

## 2021-02-05 VITALS
HEIGHT: 63 IN | BODY MASS INDEX: 34.91 KG/M2 | RESPIRATION RATE: 20 BRPM | TEMPERATURE: 98.6 F | SYSTOLIC BLOOD PRESSURE: 124 MMHG | DIASTOLIC BLOOD PRESSURE: 82 MMHG | WEIGHT: 197 LBS | OXYGEN SATURATION: 97 % | HEART RATE: 76 BPM

## 2021-02-05 DIAGNOSIS — R10.9 FLANK PAIN: ICD-10-CM

## 2021-02-05 DIAGNOSIS — J40 BRONCHITIS: ICD-10-CM

## 2021-02-05 DIAGNOSIS — N30.01 ACUTE CYSTITIS WITH HEMATURIA: ICD-10-CM

## 2021-02-05 LAB
APPEARANCE UR: CLEAR
BILIRUB UR STRIP-MCNC: NORMAL MG/DL
COLOR UR AUTO: YELLOW
GLUCOSE UR STRIP.AUTO-MCNC: NORMAL MG/DL
KETONES UR STRIP.AUTO-MCNC: NORMAL MG/DL
LEUKOCYTE ESTERASE UR QL STRIP.AUTO: NORMAL
NITRITE UR QL STRIP.AUTO: NORMAL
PH UR STRIP.AUTO: 7 [PH] (ref 5–8)
PROT UR QL STRIP: 100 MG/DL
RBC UR QL AUTO: NORMAL
SP GR UR STRIP.AUTO: 1.02
UROBILINOGEN UR STRIP-MCNC: NORMAL MG/DL

## 2021-02-05 PROCEDURE — 99000 SPECIMEN HANDLING OFFICE-LAB: CPT | Performed by: PHYSICIAN ASSISTANT

## 2021-02-05 PROCEDURE — 99214 OFFICE O/P EST MOD 30 MIN: CPT | Performed by: PHYSICIAN ASSISTANT

## 2021-02-05 PROCEDURE — 81002 URINALYSIS NONAUTO W/O SCOPE: CPT | Performed by: PHYSICIAN ASSISTANT

## 2021-02-05 PROCEDURE — 87086 URINE CULTURE/COLONY COUNT: CPT

## 2021-02-05 RX ORDER — DICLOFENAC SODIUM 75 MG/1
75 TABLET, DELAYED RELEASE ORAL 2 TIMES DAILY
Qty: 30 TAB | Refills: 0 | Status: SHIPPED | OUTPATIENT
Start: 2021-02-05 | End: 2021-02-19

## 2021-02-05 RX ORDER — METHYLPREDNISOLONE 4 MG/1
4 TABLET ORAL SEE ADMIN INSTRUCTIONS
Qty: 21 TAB | Refills: 0 | Status: SHIPPED | OUTPATIENT
Start: 2021-02-05 | End: 2021-02-10

## 2021-02-05 RX ORDER — CEFDINIR 300 MG/1
300 CAPSULE ORAL EVERY 12 HOURS
Qty: 10 CAP | Refills: 0 | Status: SHIPPED | OUTPATIENT
Start: 2021-02-05 | End: 2021-02-10

## 2021-02-05 ASSESSMENT — FIBROSIS 4 INDEX: FIB4 SCORE: 1.17

## 2021-02-05 NOTE — LETTER
February 5, 2021         Patient: Bryan Kearney   YOB: 1965   Date of Visit: 2/5/2021           To Whom it May Concern:    Bryan Kearney was seen in my clinic on 2/5/2021. She may return to work on 2/7/2021, please excuse any recent absences.    If you have any questions or concerns, please don't hesitate to call.        Sincerely,           El Ramos P.A.-C.  Electronically Signed

## 2021-02-08 LAB
BACTERIA UR CULT: NORMAL
SIGNIFICANT IND 70042: NORMAL
SITE SITE: NORMAL
SOURCE SOURCE: NORMAL

## 2021-02-10 ENCOUNTER — OFFICE VISIT (OUTPATIENT)
Dept: MEDICAL GROUP | Facility: PHYSICIAN GROUP | Age: 56
End: 2021-02-10
Payer: COMMERCIAL

## 2021-02-10 VITALS
TEMPERATURE: 98 F | SYSTOLIC BLOOD PRESSURE: 124 MMHG | BODY MASS INDEX: 33.12 KG/M2 | OXYGEN SATURATION: 98 % | WEIGHT: 194 LBS | HEIGHT: 64 IN | RESPIRATION RATE: 12 BRPM | HEART RATE: 62 BPM | DIASTOLIC BLOOD PRESSURE: 84 MMHG

## 2021-02-10 DIAGNOSIS — J22 LOWER RESPIRATORY INFECTION: ICD-10-CM

## 2021-02-10 DIAGNOSIS — R06.2 WHEEZE: ICD-10-CM

## 2021-02-10 PROCEDURE — 99213 OFFICE O/P EST LOW 20 MIN: CPT | Performed by: NURSE PRACTITIONER

## 2021-02-10 RX ORDER — PREDNISONE 20 MG/1
20 TABLET ORAL DAILY
Qty: 5 TABLET | Refills: 0 | Status: SHIPPED | OUTPATIENT
Start: 2021-02-10 | End: 2021-02-19

## 2021-02-10 ASSESSMENT — PATIENT HEALTH QUESTIONNAIRE - PHQ9
8. MOVING OR SPEAKING SO SLOWLY THAT OTHER PEOPLE COULD HAVE NOTICED. OR THE OPPOSITE, BEING SO FIGETY OR RESTLESS THAT YOU HAVE BEEN MOVING AROUND A LOT MORE THAN USUAL: NOT AT ALL
6. FEELING BAD ABOUT YOURSELF - OR THAT YOU ARE A FAILURE OR HAVE LET YOURSELF OR YOUR FAMILY DOWN: SEVERAL DAYS
5. POOR APPETITE OR OVEREATING: NOT AT ALL
1. LITTLE INTEREST OR PLEASURE IN DOING THINGS: SEVERAL DAYS
7. TROUBLE CONCENTRATING ON THINGS, SUCH AS READING THE NEWSPAPER OR WATCHING TELEVISION: SEVERAL DAYS
SUM OF ALL RESPONSES TO PHQ9 QUESTIONS 1 AND 2: 2
3. TROUBLE FALLING OR STAYING ASLEEP OR SLEEPING TOO MUCH: MORE THAN HALF THE DAYS
SUM OF ALL RESPONSES TO PHQ QUESTIONS 1-9: 8
2. FEELING DOWN, DEPRESSED, IRRITABLE, OR HOPELESS: SEVERAL DAYS
4. FEELING TIRED OR HAVING LITTLE ENERGY: MORE THAN HALF THE DAYS
9. THOUGHTS THAT YOU WOULD BE BETTER OFF DEAD, OR OF HURTING YOURSELF: NOT AT ALL

## 2021-02-10 ASSESSMENT — FIBROSIS 4 INDEX: FIB4 SCORE: 1.17

## 2021-02-10 NOTE — LETTER
February 10, 2021    To Whom It May Concern:         This is confirmation that Bryan Kearney attended her scheduled appointment with LYDIA Norris on 2/10/21.    Please excuse Ms. Kearney from work from 2/3/21 through 2/16/21 for acute illness.  She may return to work on 2/17/21.         If you have any questions please do not hesitate to call me at the phone number listed below.    Sincerely,          HEBER Norris.DONG.R.N.  [phone]

## 2021-02-10 NOTE — PROGRESS NOTES
CC: Urgent care follow-up, released back to work    HISTORY OF THE PRESENT ILLNESS: Patient is a 55 y.o. female. This pleasant patient is here today for evaluation and management of the following health problems.      Lower respiratory infection  Patient presents today to have a release back to work.  She was seen in urgent care 5 days ago for lower respiratory tract infection and low back pain, thought to be UTI.  Was prescribed Medrol Dosepak and cefdinir in addition to albuterol inhaler and guaifenesin.  Patient reports low back pain has improved.  Reports cough has greatly improved, but does still get mildly short of breath.  Covid test was negative.  She reports she feels about 60% better.  Was able to load of laundry today.  Reports that she is needing short-term disability.  Works as a .  Apparently told by her employer that all she needs is a work excuse and return to work date.  Her short-term disability provider will complete the rest of the paperwork.      Allergies: Benadryl allergy    Current Outpatient Medications Ordered in Epic   Medication Sig Dispense Refill   • predniSONE (DELTASONE) 20 MG Tab Take 1 tablet by mouth every day. 5 tablet 0   • cefdinir (OMNICEF) 300 MG Cap Take 1 Cap by mouth every 12 hours for 5 days. 10 Cap 0   • diclofenac DR (VOLTAREN) 75 MG Tablet Delayed Response Take 1 Tab by mouth 2 times a day. 30 Tab 0   • simvastatin (ZOCOR) 20 MG Tab Take 1 tablet by mouth in the evening 90 Tab 3   • metoprolol SR (TOPROL XL) 100 MG TABLET SR 24 HR Take 2 tablets by mouth once daily 180 Tab 1   • escitalopram (LEXAPRO) 10 MG Tab TAKE 1 TABLET BY MOUTH ONCE DAILY 90 Tab 1   • albuterol 108 (90 Base) MCG/ACT Aero Soln inhalation aerosol Inhale 2 Puffs by mouth every four hours as needed. 1 Inhaler 0   • Cholecalciferol (VITAMIN D3) 1000 units Cap Take  by mouth.     • guaiFENesin LA (MUCINEX) 600 MG TABLET SR 12 HR Take 1 Tab by mouth every 12 hours. 28 Tab 0   • albuterol  "108 (90 BASE) MCG/ACT Aero Soln inhalation aerosol Inhale 2 Puffs by mouth every four hours as needed for Shortness of Breath. 1 Inhaler 1   • aspirin EC (ECOTRIN) 81 MG Tablet Delayed Response Take 81 mg by mouth every day.       No current [facility]-ordered facility-administered medications on file.       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   • TONSILLECTOMY     • TYMPANOMASTOIDECTOMY Left        Social History     Tobacco Use   • Smoking status: Current Every Day Smoker     Packs/day: 1.00     Years: 38.00     Pack years: 38.00     Types: Cigarettes   • Smokeless tobacco: Never Used   Substance Use Topics   • Alcohol use: No   • Drug use: No       Family History   Problem Relation Age of Onset   • Hypertension Father    • Heart Disease Father        ROS:   As in HPI, otherwise negative for chest pain, abdominal pain, dysuria, blood in stool, fever            Exam: /84 (BP Location: Left arm, Patient Position: Sitting, BP Cuff Size: Adult)   Pulse 62   Temp 36.7 °C (98 °F) (Temporal)   Resp 12   Ht 1.626 m (5' 4\")   Wt 88 kg (194 lb)   SpO2 98%  Body mass index is 33.3 kg/m².    General: Alert, pleasant, well nourished, well developed female in NAD  Neck: Supple without bruit. Thyroid is not enlarged.  Pulmonary: Expiratory wheeze throughout, rhonchi somewhat cleared with cough.  Mild effort.   Cardiovascular: Normal rate and rhythm without murmur.   Abdomen: Soft, nontender, nondistended. Negative CVA tenderness  Neurologic: Grossly nonfocal  Lymph: No cervical or " supraclavicular lymph nodes are palpable  Skin: Warm and dry.    Psych: Normal mood and affect. Alert and oriented. Judgment and insight is normal.    Please note that this dictation was created using voice recognition software. I have made every reasonable attempt to correct obvious errors, but I expect that there are errors of grammar and possibly content that I did not discover before finalizing the note.      Assessment/Plan  1. Lower respiratory infection  Patient is improving.  However she is mildly short of breath with talking.  Also has expiratory wheeze, somewhat cleared with cough.  Would like patient to continue to be off work until next Wednesday.  Letter has been provided to patient today excusing her from work from 2/3/2021 through 2/16/2021.  Will prescribe another prednisone burst, stronger dose.  Instructed to discontinue diclofenac while taking prednisone.  Continue with albuterol inhaler, guaifenesin.  Patient will return to clinic/urgent care/ER if symptoms worsen.  - predniSONE (DELTASONE) 20 MG Tab; Take 1 tablet by mouth every day.  Dispense: 5 tablet; Refill: 0    2. Wheeze  As in #1  - predniSONE (DELTASONE) 20 MG Tab; Take 1 tablet by mouth every day.  Dispense: 5 tablet; Refill: 0

## 2021-02-10 NOTE — ASSESSMENT & PLAN NOTE
Patient presents today to have a release back to work.  She was seen in urgent care 5 days ago for lower respiratory tract infection and low back pain, thought to be UTI.  Was prescribed Medrol Dosepak and cefdinir in addition to albuterol inhaler and guaifenesin.  Patient reports low back pain has improved.  Reports cough has greatly improved, but does still get mildly short of breath.  Covid test was negative.  She reports she feels about 60% better.  Was able to load of laundry today.  Reports that she is needing short-term disability.  Works as a .  Apparently told by her employer that all she needs is a work excuse and return to work date.  Her short-term disability provider will complete the rest of the paperwork.

## 2021-02-18 ENCOUNTER — TELEPHONE (OUTPATIENT)
Dept: MEDICAL GROUP | Facility: PHYSICIAN GROUP | Age: 56
End: 2021-02-18

## 2021-02-18 NOTE — TELEPHONE ENCOUNTER
1. Caller Name:Bryan Kearney   [phone] (home)                             Call Back Number:       How would the patient prefer to be contacted with a response: [employer] message    Received a from from [place] to be filled out and faxed.   Scanned in to media

## 2021-02-19 ENCOUNTER — OFFICE VISIT (OUTPATIENT)
Dept: MEDICAL GROUP | Facility: PHYSICIAN GROUP | Age: 56
End: 2021-02-19
Payer: COMMERCIAL

## 2021-02-19 VITALS
HEIGHT: 63 IN | BODY MASS INDEX: 34.73 KG/M2 | TEMPERATURE: 98.6 F | HEART RATE: 86 BPM | WEIGHT: 196 LBS | RESPIRATION RATE: 14 BRPM | DIASTOLIC BLOOD PRESSURE: 78 MMHG | OXYGEN SATURATION: 94 % | SYSTOLIC BLOOD PRESSURE: 120 MMHG

## 2021-02-19 DIAGNOSIS — Z87.440 HISTORY OF URINARY TRACT INFECTION: ICD-10-CM

## 2021-02-19 DIAGNOSIS — J22 LOWER RESPIRATORY INFECTION: ICD-10-CM

## 2021-02-19 PROCEDURE — 99213 OFFICE O/P EST LOW 20 MIN: CPT | Performed by: NURSE PRACTITIONER

## 2021-02-19 ASSESSMENT — FIBROSIS 4 INDEX: FIB4 SCORE: 1.17

## 2021-02-19 NOTE — PROGRESS NOTES
CC: Short-term disability paperwork    HISTORY OF THE PRESENT ILLNESS: Patient is a 55 y.o. female. This pleasant patient is here today for evaluation and management of the following health problems.      Lower respiratory infection  Presents today for short-term disability for missing work due to lower respiratory tract infection and urinary tract infection.  She missed work from 2/3 through 2/16/2021.  Patient was seen in urgent care on 2/3/2021 and treated for respiratory tract infection and urinary tract infection.  Patient was then reevaluated by me on 2/10/2021.  She was still having shortness of breath and wheezing.  Another prednisone burst was prescribed at that time.  Patient reports she is returned to work on 2/17/2021.  She is feeling much better.  Still using her albuterol inhaler as needed, about once a day.  Still has loose productive cough.  Flank pain from UTI has resolved.      Allergies: Benadryl allergy    Current Outpatient Medications Ordered in Epic   Medication Sig Dispense Refill   • simvastatin (ZOCOR) 20 MG Tab Take 1 tablet by mouth in the evening 90 Tab 3   • metoprolol SR (TOPROL XL) 100 MG TABLET SR 24 HR Take 2 tablets by mouth once daily 180 Tab 1   • escitalopram (LEXAPRO) 10 MG Tab TAKE 1 TABLET BY MOUTH ONCE DAILY 90 Tab 1   • albuterol 108 (90 Base) MCG/ACT Aero Soln inhalation aerosol Inhale 2 Puffs by mouth every four hours as needed. 1 Inhaler 0   • Cholecalciferol (VITAMIN D3) 1000 units Cap Take  by mouth.     • albuterol 108 (90 BASE) MCG/ACT Aero Soln inhalation aerosol Inhale 2 Puffs by mouth every four hours as needed for Shortness of Breath. 1 Inhaler 1   • aspirin EC (ECOTRIN) 81 MG Tablet Delayed Response Take 81 mg by mouth every day.       No current [facility]-ordered facility-administered medications on file.       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   • TONSILLECTOMY     • TYMPANOMASTOIDECTOMY Left        Social History     Tobacco Use   • Smoking status: Current Every Day Smoker     Packs/day: 1.00     Years: 38.00     Pack years: 38.00     Types: Cigarettes   • Smokeless tobacco: Never Used   Substance Use Topics   • Alcohol use: No   • Drug use: No       Family History   Problem Relation Age of Onset   • Hypertension Father    • Heart Disease Father        ROS:   As in HPI, otherwise negative for chest pain, dyspnea, abdominal pain, dysuria, blood in stool, fever         Exam: /78 (BP Location: Left arm, Patient Position: Sitting, BP Cuff Size: Adult)   Pulse 86   Temp 37 °C (98.6 °F) (Temporal)   Resp 14   Ht 1.6 m (5' 3\")   Wt 88.9 kg (196 lb)   SpO2 94%  Body mass index is 34.72 kg/m².    General: Alert, pleasant, well nourished, well developed female in NAD  Neck: Supple without bruit.   Pulmonary: Rhonchi cleared with cough.  Normal effort. No rales, ronchi, or wheezing.  Cardiovascular: Normal rate and rhythm without murmur.   VA tenderness  Neurologic: Grossly nonfocal  Skin: Warm and dry.    Musculoskeletal: Normal gait.   Psych: Normal mood and affect. Alert and oriented. Judgment and insight is normal.    Please note that this dictation was created using voice recognition software. I have made every reasonable attempt to correct obvious errors, but I expect that there are errors of grammar and possibly content that I did not discover before finalizing the note.      Assessment/Plan  1. History of urinary tract infection  Patient will get repeat urinalysis in 2 weeks.  - URINALYSIS,CULTURE IF INDICATED; " Future    2. Lower respiratory infection  Disability paperwork completed.  Will be faxed to [place] and needs to include chart notes from urgent care to myself.  Be scanned into chart.  Copy to be mailed to patient.    Patient will call for follow-up appointment.

## 2021-02-20 NOTE — ASSESSMENT & PLAN NOTE
Presents today for short-term disability for missing work due to lower respiratory tract infection and urinary tract infection.  She missed work from 2/3 through 2/16/2021.  Patient was seen in urgent care on 2/3/2021 and treated for respiratory tract infection and urinary tract infection.  Patient was then reevaluated by me on 2/10/2021.  She was still having shortness of breath and wheezing.  Another prednisone burst was prescribed at that time.  Patient reports she is returned to work on 2/17/2021.  She is feeling much better.  Still using her albuterol inhaler as needed, about once a day.  Still has loose productive cough.  Flank pain from UTI has resolved.

## 2021-03-31 DIAGNOSIS — F32.1 MODERATE SINGLE CURRENT EPISODE OF MAJOR DEPRESSIVE DISORDER (HCC): ICD-10-CM

## 2021-04-01 RX ORDER — ESCITALOPRAM OXALATE 10 MG/1
TABLET ORAL
Qty: 90 TABLET | Refills: 3 | Status: SHIPPED | OUTPATIENT
Start: 2021-04-01 | End: 2022-05-31 | Stop reason: SDUPTHER

## 2021-07-27 DIAGNOSIS — I10 ESSENTIAL HYPERTENSION, BENIGN: ICD-10-CM

## 2021-07-27 DIAGNOSIS — R51.9 FREQUENT HEADACHES: ICD-10-CM

## 2021-07-27 RX ORDER — METOPROLOL SUCCINATE 100 MG/1
TABLET, EXTENDED RELEASE ORAL
Qty: 180 TABLET | Refills: 0 | Status: SHIPPED | OUTPATIENT
Start: 2021-07-27 | End: 2021-11-09

## 2021-07-27 NOTE — TELEPHONE ENCOUNTER
Received request via: Pharmacy    Was the patient seen in the last year in this department? Yes    Does the patient have an active prescription (recently filled or refills available) for medication(s) requested? No    Last OV:02/19/21  Last Labs: 11/16/20    Current Outpatient Medications   Medication Sig Dispense Refill   • escitalopram (LEXAPRO) 10 MG Tab Take 1 tablet by mouth once daily 90 tablet 3   • simvastatin (ZOCOR) 20 MG Tab Take 1 tablet by mouth in the evening 90 Tab 3   • metoprolol SR (TOPROL XL) 100 MG TABLET SR 24 HR Take 2 tablets by mouth once daily 180 Tab 1   • albuterol 108 (90 Base) MCG/ACT Aero Soln inhalation aerosol Inhale 2 Puffs by mouth every four hours as needed. 1 Inhaler 0   • Cholecalciferol (VITAMIN D3) 1000 units Cap Take  by mouth.     • albuterol 108 (90 BASE) MCG/ACT Aero Soln inhalation aerosol Inhale 2 Puffs by mouth every four hours as needed for Shortness of Breath. 1 Inhaler 1   • aspirin EC (ECOTRIN) 81 MG Tablet Delayed Response Take 81 mg by mouth every day.       No current facility-administered medications for this visit.

## 2021-09-15 ENCOUNTER — OFFICE VISIT (OUTPATIENT)
Dept: MEDICAL GROUP | Facility: PHYSICIAN GROUP | Age: 56
End: 2021-09-15
Payer: COMMERCIAL

## 2021-09-15 VITALS
HEIGHT: 64 IN | BODY MASS INDEX: 33.39 KG/M2 | HEART RATE: 60 BPM | WEIGHT: 195.6 LBS | DIASTOLIC BLOOD PRESSURE: 80 MMHG | OXYGEN SATURATION: 96 % | TEMPERATURE: 97.2 F | RESPIRATION RATE: 18 BRPM | SYSTOLIC BLOOD PRESSURE: 132 MMHG

## 2021-09-15 DIAGNOSIS — F32.1 CURRENT MODERATE EPISODE OF MAJOR DEPRESSIVE DISORDER WITHOUT PRIOR EPISODE (HCC): ICD-10-CM

## 2021-09-15 DIAGNOSIS — E78.2 MIXED HYPERLIPIDEMIA: ICD-10-CM

## 2021-09-15 DIAGNOSIS — F17.210 CIGARETTE NICOTINE DEPENDENCE WITHOUT COMPLICATION: ICD-10-CM

## 2021-09-15 DIAGNOSIS — I10 ESSENTIAL HYPERTENSION, BENIGN: ICD-10-CM

## 2021-09-15 DIAGNOSIS — E04.9 ENLARGED THYROID: ICD-10-CM

## 2021-09-15 DIAGNOSIS — R51.9 FREQUENT HEADACHES: ICD-10-CM

## 2021-09-15 PROCEDURE — 99214 OFFICE O/P EST MOD 30 MIN: CPT | Performed by: NURSE PRACTITIONER

## 2021-09-15 RX ORDER — LISINOPRIL 10 MG/1
10 TABLET ORAL DAILY
Qty: 30 TABLET | Refills: 2 | Status: SHIPPED | OUTPATIENT
Start: 2021-09-15 | End: 2021-10-26 | Stop reason: SDUPTHER

## 2021-09-15 ASSESSMENT — FIBROSIS 4 INDEX: FIB4 SCORE: 1.17

## 2021-09-15 NOTE — ASSESSMENT & PLAN NOTE
This is a chronic health problem that is well controlled with current medications and lifestyle measures.  Lexapro, working well.  Not irritable.  Lives with .  Denies SI/HI.

## 2021-09-15 NOTE — ASSESSMENT & PLAN NOTE
Chronic health problem, taking simvastatin 20 mg daily.  We will get updated lipid profile.  Component      Latest Ref Rng & Units 11/16/2020          11:53 AM   Cholesterol,Tot      100 - 199 mg/dL 217 (H)   Triglycerides      0 - 149 mg/dL 222 (H)   HDL      >=40 mg/dL 54   LDL      <100 mg/dL 119 (H)

## 2021-09-15 NOTE — PROGRESS NOTES
"CC: Chronic health problems, hypertension problem    HISTORY OF THE PRESENT ILLNESS: Patient is a 55 y.o. female. This pleasant patient is here today for evaluation and management of the following health problems.      Major depression, single episode  This is a chronic health problem that is well controlled with current medications and lifestyle measures.  Lexapro, working well.  Not irritable.  Lives with .  Denies SI/HI.    Mixed hyperlipidemia  Chronic health problem, taking simvastatin 20 mg daily.  We will get updated lipid profile.  Component      Latest Ref Rng & Units 11/16/2020          11:53 AM   Cholesterol,Tot      100 - 199 mg/dL 217 (H)   Triglycerides      0 - 149 mg/dL 222 (H)   HDL      >=40 mg/dL 54   LDL      <100 mg/dL 119 (H)       Essential hypertension, benign  Chronic health problem.  Taking metoprolol  mg daily.  Reports readings in the last 2 weeks have been 140-150s/90s.  Having increase in headaches.  During recent employee health check for mask fit and helmet fit, patient's blood pressure was elevated though her breathing was okay.  She was told to follow-up with her primary care regarding blood pressure.  Does report some mild shortness of breath in the beginning of activity, but then breathes normally shortly after starting activity.  The patient denies chest pain, vision changes, epistaxis.  Has cut down smoking to half a pack a day.      Enlarged thyroid  Chronic health problem.  Patient reports several years ago benign nodules.  However, she reports that at times it feels mildly difficult to inhale, \"it feels constricted in my throat at times.\"  Denies difficulty swallowing or hoarse voice.    Cigarette nicotine dependence without complication  Patient has over 30-pack-year history.  Discussed lung cancer screening program.  Patient is open to referral.      Allergies: Benadryl allergy    Current Outpatient Medications Ordered in Epic   Medication Sig Dispense Refill "   • HYDROCHLOROTHIAZIDE PO Take  by mouth.     • lisinopril (PRINIVIL) 10 MG Tab Take 1 Tablet by mouth every day. 30 Tablet 2   • metoprolol SR (TOPROL XL) 100 MG TABLET SR 24 HR Take 2 tablets by mouth once daily 180 tablet 0   • escitalopram (LEXAPRO) 10 MG Tab Take 1 tablet by mouth once daily 90 tablet 3   • simvastatin (ZOCOR) 20 MG Tab Take 1 tablet by mouth in the evening 90 Tab 3   • albuterol 108 (90 Base) MCG/ACT Aero Soln inhalation aerosol Inhale 2 Puffs by mouth every four hours as needed. 1 Inhaler 0   • Cholecalciferol (VITAMIN D3) 1000 units Cap Take  by mouth.     • albuterol 108 (90 BASE) MCG/ACT Aero Soln inhalation aerosol Inhale 2 Puffs by mouth every four hours as needed for Shortness of Breath. 1 Inhaler 1   • aspirin EC (ECOTRIN) 81 MG Tablet Delayed Response Take 81 mg by mouth every day.       No current [facility]-ordered facility-administered medications on file.       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   • TONSILLECTOMY     • TYMPANOMASTOIDECTOMY Left        Social History     Tobacco Use   • Smoking status: Current Every Day Smoker     Packs/day: 0.50     Years: 38.00     Pack years: 19.00     Types: Cigarettes   • Smokeless tobacco: Never Used   Vaping Use   • Vaping Use: Some days   Substance Use Topics   • Alcohol use: No   • Drug use: No       Family History   Problem Relation Age of Onset   • Hypertension Father    • Heart Disease Father        ROS:   As in HPI, otherwise negative for chest pain, dyspnea, abdominal pain, dysuria, blood in  "stool, fever             Exam: /80 (BP Location: Left arm, Patient Position: Sitting, BP Cuff Size: Adult)   Pulse 60   Temp 36.2 °C (97.2 °F) (Temporal)   Resp 18   Ht 1.626 m (5' 4\")   Wt 88.7 kg (195 lb 9.6 oz)   SpO2 96%  Body mass index is 33.57 kg/m².    General: Alert, pleasant, well nourished, well developed female in NAD  HEENT: Normocephalic. Eyes conjunctiva clear lids without ptosis, pupils equal and reactive to light, ears normal shape and contour, canals are clear bilaterally, tympanic membranes are pearly gray with good light reflex, nasal mucosa without erythema and drainage, oropharynx is without erythema, edema or exudates.   Neck: Supple without bruit. Thyroid is enlarged.  Pulmonary: Clear to ausculation.  Normal effort. No rales, ronchi, or wheezing.  Cardiovascular: Normal rate and rhythm without murmur. Carotid and radial pulses are intact and equal bilaterally.  No lower extremity edema.  Abdomen: Soft, nontender, nondistended. Normal bowel sounds. Liver and spleen are not palpable  Neurologic: Grossly nonfocal  Lymph: No cervical or supraclavicular lymph nodes are palpable  Skin: Warm and dry.   Musculoskeletal: Normal gait.   Psych: Normal mood and affect. Alert and oriented. Judgment and insight is normal.    Please note that this dictation was created using voice recognition software. I have made every reasonable attempt to correct obvious errors, but I expect that there are errors of grammar and possibly content that I did not discover before finalizing the note.      Assessment/Plan  1. Current moderate episode of major depressive disorder without prior episode (HCC)  Well-controlled.  Continue with Escitalopram, no changes.  Advised on routine aerobic exercise as tolerated.    2. Enlarged thyroid  Symptomatic with feeling of constriction in her throat.  Will get thyroid ultrasound.  Consider referral to ENT or endocrinology.  - TSH WITH REFLEX TO FT4; Future  - US-THYROID; " Future    3. Essential hypertension, benign  Patient having elevated blood pressure readings.  We will add lisinopril.  Instructions and side effects reviewed with patient.  Patient will continue to monitor her blood pressure at home and bring in readings to next appointment.  Letter for work permitting patient to continue her job in regards to her blood pressure given to patient today.  - Comp Metabolic Panel; Future  - ESTIMATED GFR; Future  - lisinopril (PRINIVIL) 10 MG Tab; Take 1 Tablet by mouth every day.  Dispense: 30 Tablet; Refill: 2    4. Mixed hyperlipidemia  We will get updated lipid profile.  In the meantime continue with simvastatin at current dose.  - Lipid Profile; Future    5. Frequent headaches  Continue to monitor.  Tylenol for relief.  May be due to increase in blood pressure.    6. Cigarette nicotine dependence without complication    - REFERRAL TO LUNG CANCER SCREENING PROGRAM      Patient will return to clinic in 4 weeks for follow-up on hypertension and lab review or sooner if needed.

## 2021-09-15 NOTE — LETTER
September 15, 2021    To Whom It May Concern:         This is confirmation that Byran Kearney attended her scheduled appointment with LYDIA Norris on 9/15/21.     In my opinion, Ms. Kearney is medically stable to continue to work with a respirator or half-mask.  We are currently working on her blood pressure control and thyroid work-up.         If you have any questions please do not hesitate to call me at the phone number listed below.    Sincerely,          HEBER Norris.P.R.N.  [phone]

## 2021-09-16 PROBLEM — F17.210 CIGARETTE NICOTINE DEPENDENCE WITHOUT COMPLICATION: Status: ACTIVE | Noted: 2021-09-16

## 2021-09-16 NOTE — ASSESSMENT & PLAN NOTE
Patient has over 30-pack-year history.  Discussed lung cancer screening program.  Patient is open to referral.

## 2021-09-16 NOTE — ASSESSMENT & PLAN NOTE
Chronic health problem.  Taking metoprolol  mg daily.  Reports readings in the last 2 weeks have been 140-150s/90s.  Having increase in headaches.  During recent employee health check for mask fit and helmet fit, patient's blood pressure was elevated though her breathing was okay.  She was told to follow-up with her primary care regarding blood pressure.  Does report some mild shortness of breath in the beginning of activity, but then breathes normally shortly after starting activity.  The patient denies chest pain, vision changes, epistaxis.  Has cut down smoking to half a pack a day.

## 2021-09-16 NOTE — ASSESSMENT & PLAN NOTE
"Chronic health problem.  Patient reports several years ago benign nodules.  However, she reports that at times it feels mildly difficult to inhale, \"it feels constricted in my throat at times.\"  Denies difficulty swallowing or hoarse voice.  "

## 2021-09-20 ENCOUNTER — TELEPHONE (OUTPATIENT)
Dept: HEMATOLOGY ONCOLOGY | Facility: MEDICAL CENTER | Age: 56
End: 2021-09-20

## 2021-09-20 NOTE — TELEPHONE ENCOUNTER
Received referral to lung cancer screening program.  Chart review to assess for lung cancer screening program eligibility.   1. Age 55-77 yrs of age? Yes 55 y.o.  2. 30 pack year hx of smoking, or greater? Yes 1 [id number] hx  3. Current smoker or if quit, has pt quit within last 15 yrs?Yes  Current smoker, currently smoking 0.5ppd  4. Any signs or symptoms of lung cancer? None noted  5. Previous history of lung cancer? None noted  6. Chest CT within past 12 mos.? None noted  Patient does meet eligibility criteria. [facility] scheduling notified to schedule the shared decision making visit.

## 2021-10-04 ENCOUNTER — HOSPITAL ENCOUNTER (OUTPATIENT)
Dept: RADIOLOGY | Facility: MEDICAL CENTER | Age: 56
End: 2021-10-04
Attending: NURSE PRACTITIONER
Payer: COMMERCIAL

## 2021-10-04 DIAGNOSIS — E04.9 ENLARGED THYROID: ICD-10-CM

## 2021-10-04 PROCEDURE — 76536 US EXAM OF HEAD AND NECK: CPT

## 2021-10-19 ENCOUNTER — HOSPITAL ENCOUNTER (OUTPATIENT)
Dept: LAB | Facility: MEDICAL CENTER | Age: 56
End: 2021-10-19
Attending: NURSE PRACTITIONER
Payer: COMMERCIAL

## 2021-10-19 DIAGNOSIS — E04.9 ENLARGED THYROID: ICD-10-CM

## 2021-10-19 DIAGNOSIS — I10 ESSENTIAL HYPERTENSION, BENIGN: ICD-10-CM

## 2021-10-19 DIAGNOSIS — E78.2 MIXED HYPERLIPIDEMIA: ICD-10-CM

## 2021-10-19 DIAGNOSIS — Z87.440 HISTORY OF URINARY TRACT INFECTION: ICD-10-CM

## 2021-10-19 LAB
ALBUMIN SERPL BCP-MCNC: 4.7 G/DL (ref 3.2–4.9)
ALBUMIN/GLOB SERPL: 2 G/DL
ALP SERPL-CCNC: 86 U/L (ref 30–99)
ALT SERPL-CCNC: 21 U/L (ref 2–50)
ANION GAP SERPL CALC-SCNC: 9 MMOL/L (ref 7–16)
APPEARANCE UR: CLEAR
AST SERPL-CCNC: 18 U/L (ref 12–45)
BILIRUB SERPL-MCNC: 0.5 MG/DL (ref 0.1–1.5)
BILIRUB UR QL STRIP.AUTO: NEGATIVE
BUN SERPL-MCNC: 13 MG/DL (ref 8–22)
CALCIUM SERPL-MCNC: 9.7 MG/DL (ref 8.5–10.5)
CHLORIDE SERPL-SCNC: 104 MMOL/L (ref 96–112)
CHOLEST SERPL-MCNC: 205 MG/DL (ref 100–199)
CO2 SERPL-SCNC: 27 MMOL/L (ref 20–33)
COLOR UR: YELLOW
CREAT SERPL-MCNC: 0.85 MG/DL (ref 0.5–1.4)
FASTING STATUS PATIENT QL REPORTED: NORMAL
GLOBULIN SER CALC-MCNC: 2.4 G/DL (ref 1.9–3.5)
GLUCOSE SERPL-MCNC: 104 MG/DL (ref 65–99)
GLUCOSE UR STRIP.AUTO-MCNC: NEGATIVE MG/DL
HDLC SERPL-MCNC: 54 MG/DL
KETONES UR STRIP.AUTO-MCNC: NEGATIVE MG/DL
LDLC SERPL CALC-MCNC: 124 MG/DL
LEUKOCYTE ESTERASE UR QL STRIP.AUTO: NEGATIVE
MICRO URNS: NORMAL
NITRITE UR QL STRIP.AUTO: NEGATIVE
PH UR STRIP.AUTO: 7 [PH] (ref 5–8)
POTASSIUM SERPL-SCNC: 4.3 MMOL/L (ref 3.6–5.5)
PROT SERPL-MCNC: 7.1 G/DL (ref 6–8.2)
PROT UR QL STRIP: NEGATIVE MG/DL
RBC UR QL AUTO: NEGATIVE
SODIUM SERPL-SCNC: 140 MMOL/L (ref 135–145)
SP GR UR STRIP.AUTO: 1.01
TRIGL SERPL-MCNC: 137 MG/DL (ref 0–149)
TSH SERPL DL<=0.005 MIU/L-ACNC: 2.13 UIU/ML (ref 0.38–5.33)
UROBILINOGEN UR STRIP.AUTO-MCNC: 0.2 MG/DL

## 2021-10-19 PROCEDURE — 80061 LIPID PANEL: CPT

## 2021-10-19 PROCEDURE — 81003 URINALYSIS AUTO W/O SCOPE: CPT

## 2021-10-19 PROCEDURE — 84443 ASSAY THYROID STIM HORMONE: CPT

## 2021-10-19 PROCEDURE — 80053 COMPREHEN METABOLIC PANEL: CPT

## 2021-10-19 PROCEDURE — 36415 COLL VENOUS BLD VENIPUNCTURE: CPT

## 2021-10-26 ENCOUNTER — OFFICE VISIT (OUTPATIENT)
Dept: MEDICAL GROUP | Facility: PHYSICIAN GROUP | Age: 56
End: 2021-10-26
Payer: COMMERCIAL

## 2021-10-26 VITALS
RESPIRATION RATE: 18 BRPM | BODY MASS INDEX: 33.24 KG/M2 | TEMPERATURE: 97.6 F | OXYGEN SATURATION: 97 % | HEIGHT: 64 IN | WEIGHT: 194.7 LBS | HEART RATE: 54 BPM | DIASTOLIC BLOOD PRESSURE: 82 MMHG | SYSTOLIC BLOOD PRESSURE: 130 MMHG

## 2021-10-26 DIAGNOSIS — R51.9 INTRACTABLE HEADACHE, UNSPECIFIED CHRONICITY PATTERN, UNSPECIFIED HEADACHE TYPE: ICD-10-CM

## 2021-10-26 DIAGNOSIS — Z12.31 ENCOUNTER FOR SCREENING MAMMOGRAM FOR BREAST CANCER: ICD-10-CM

## 2021-10-26 DIAGNOSIS — Z82.49 FAMILY HISTORY OF BRAIN ANEURYSM: ICD-10-CM

## 2021-10-26 DIAGNOSIS — E78.2 MIXED HYPERLIPIDEMIA: ICD-10-CM

## 2021-10-26 DIAGNOSIS — Z11.59 NEED FOR HEPATITIS C SCREENING TEST: ICD-10-CM

## 2021-10-26 DIAGNOSIS — I10 ESSENTIAL HYPERTENSION, BENIGN: ICD-10-CM

## 2021-10-26 PROCEDURE — 99214 OFFICE O/P EST MOD 30 MIN: CPT | Performed by: NURSE PRACTITIONER

## 2021-10-26 RX ORDER — LISINOPRIL 20 MG/1
20 TABLET ORAL DAILY
Qty: 90 TABLET | Refills: 1 | Status: SHIPPED | OUTPATIENT
Start: 2021-10-26 | End: 2022-06-16

## 2021-10-26 RX ORDER — SIMVASTATIN 40 MG
40 TABLET ORAL EVERY EVENING
Qty: 90 TABLET | Refills: 1 | Status: SHIPPED | OUTPATIENT
Start: 2021-10-26 | End: 2022-06-16

## 2021-10-26 ASSESSMENT — FIBROSIS 4 INDEX: FIB4 SCORE: 0.91

## 2021-10-26 NOTE — ASSESSMENT & PLAN NOTE
This is a chronic health problem that is well controlled with current medications and lifestyle measures.  Lisinopril 10 mg was added at last appointment.  Patient now taking lisinopril and metoprolol.  Reports headaches on the right side had have greatly improved since starting lisinopril.  Sensation of pressure still present, but no longer associated with sensitivity to noise.  Reports has family history of brain aneurysm in mother, maternal grandmother, maternal uncles x2.  CT in 2013 normal.  Home blood pressure readings are in the 120/80-82.  The patient denies chest pain, shortness of breath, vision changes, epistaxis, or dyspnea on exertion.

## 2021-10-26 NOTE — PROGRESS NOTES
CC: Lab review, follow-up on hypertension    HISTORY OF THE PRESENT ILLNESS: Patient is a 56 y.o. female. This pleasant patient is here today for evaluation and management of the following health problems.    Health Maintenance: due  Patient declined flu vaccine, though we reviewed benefits of flu vaccine.  Encouraged him to wash his hands frequently and stay out of environments with people who are ill.        Essential hypertension, benign  This is a chronic health problem that is well controlled with current medications and lifestyle measures.  Lisinopril 10 mg was added at last appointment.  Patient now taking lisinopril and metoprolol.  Reports headaches on the right side had have greatly improved since starting lisinopril.  Sensation of pressure still present, but no longer associated with sensitivity to noise.  Reports has family history of brain aneurysm in mother, maternal grandmother, maternal uncles x2.  CT in 2013 normal.  Home blood pressure readings are in the 120/80-82.  The patient denies chest pain, shortness of breath, vision changes, epistaxis, or dyspnea on exertion.      Mixed hyperlipidemia  This is a chronic health problem that is uncontrolled with current medications and lifestyle measures.  Taking simvastatin 20 mg daily.  Working on lifestyle measures.  Component      Latest Ref Rng & Units 11/16/2020 10/19/2021          11:53 AM 11:51 AM   Cholesterol,Tot      100 - 199 mg/dL 217 (H) 205 (H)   Triglycerides      0 - 149 mg/dL 222 (H) 137   HDL      >=40 mg/dL 54 54   LDL      <100 mg/dL 119 (H) 124 (H)       Allergies: Benadryl allergy    Current Outpatient Medications Ordered in Epic   Medication Sig Dispense Refill   • lisinopril (PRINIVIL) 20 MG Tab Take 1 Tablet by mouth every day. 90 Tablet 1   • simvastatin (ZOCOR) 40 MG Tab Take 1 Tablet by mouth every evening. 90 Tablet 1   • HYDROCHLOROTHIAZIDE PO Take  by mouth.     • metoprolol SR (TOPROL XL) 100 MG TABLET SR 24 HR Take 2 tablets  by mouth once daily 180 tablet 0   • escitalopram (LEXAPRO) 10 MG Tab Take 1 tablet by mouth once daily 90 tablet 3   • albuterol 108 (90 Base) MCG/ACT Aero Soln inhalation aerosol Inhale 2 Puffs by mouth every four hours as needed. 1 Inhaler 0   • Cholecalciferol (VITAMIN D3) 1000 units Cap Take  by mouth.     • albuterol 108 (90 BASE) MCG/ACT Aero Soln inhalation aerosol Inhale 2 Puffs by mouth every four hours as needed for Shortness of Breath. 1 Inhaler 1   • aspirin EC (ECOTRIN) 81 MG Tablet Delayed Response Take 81 mg by mouth every day.       No current [facility]-ordered facility-administered medications on file.       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   • TONSILLECTOMY     • TYMPANOMASTOIDECTOMY Left        Social History     Tobacco Use   • Smoking status: Current Every Day Smoker     Packs/day: 0.50     Years: 38.00     Pack years: 19.00     Types: Cigarettes   • Smokeless tobacco: Never Used   Vaping Use   • Vaping Use: Some days   Substance Use Topics   • Alcohol use: No   • Drug use: No       Family History   Problem Relation Age of Onset   • Arterial Aneurysm Mother         brain   • Hypertension Father    • Heart Disease Father    • Arterial Aneurysm Maternal Uncle         brain   • Arterial Aneurysm Maternal Grandmother         brain   • Arterial Aneurysm Maternal Uncle         brain       ROS:   As in HPI, otherwise negative for chest pain, dyspnea, abdominal pain, dysuria, blood in stool, fever           Exam: /82 (BP Location: Left arm, Patient  "Position: Sitting, BP Cuff Size: Adult)   Pulse (!) 54   Temp 36.4 °C (97.6 °F) (Temporal)   Resp 18   Ht 1.626 m (5' 4\")   Wt 88.3 kg (194 lb 11.2 oz)   SpO2 97%  Body mass index is 33.42 kg/m².    General: Alert, pleasant, well nourished, well developed female in NAD  Neck: Supple without bruit.   Pulmonary: Clear to ausculation.  Normal effort. No rales, ronchi, or wheezing.  Cardiovascular: Normal rate and rhythm without murmur.  Neuro: CN 2-12 tested and grossly intact, strength testing in upper and lower extremities is 5/5 and equal bilaterally, patellar DTRs 1+ and equal bilaterally, gross motor movement intact in all 4 extremities, Gross sensation intact to extremities and trunk, Gait grossly normal and not antalgic  Skin: Warm and dry.   Musculoskeletal: Normal gait.   Psych: Normal mood and affect. Alert and oriented. Judgment and insight is normal.    Component      Latest Ref Rng & Units 10/19/2021   Sodium      135 - 145 mmol/L 140   Potassium      3.6 - 5.5 mmol/L 4.3   Chloride      96 - 112 mmol/L 104   Co2      20 - 33 mmol/L 27   Anion Gap      7.0 - 16.0 9.0   Glucose      65 - 99 mg/dL 104 (H)   Bun      8 - 22 mg/dL 13   Creatinine      0.50 - 1.40 mg/dL 0.85   Calcium      8.5 - 10.5 mg/dL 9.7   AST(SGOT)      12 - 45 U/L 18   ALT(SGPT)      2 - 50 U/L 21   Alkaline Phosphatase      30 - 99 U/L 86   Total Bilirubin      0.1 - 1.5 mg/dL 0.5   Albumin      3.2 - 4.9 g/dL 4.7   Total Protein      6.0 - 8.2 g/dL 7.1   Globulin      1.9 - 3.5 g/dL 2.4   A-G Ratio      g/dL 2.0   Color       Yellow   Character       Clear   Specific Gravity      <1.035 1.008   Ph      5.0 - 8.0 7.0   Glucose      Negative mg/dL Negative   Ketones      Negative mg/dL Negative   Protein      Negative mg/dL Negative   Bilirubin      Negative Negative   Urobilinogen, Urine      Negative 0.2   Nitrite      Negative Negative   Leukocyte Esterase      Negative Negative   Occult Blood      Negative Negative   Micro " Urine Req       see below   GFR If African American      >60 mL/min/1.73 m 2 >60   GFR If Non African American      >60 mL/min/1.73 m 2 >60   TSH      0.380 - 5.330 uIU/mL 2.130       Please note that this dictation was created using voice recognition software. I have made every reasonable attempt to correct obvious errors, but I expect that there are errors of grammar and possibly content that I did not discover before finalizing the note.      Assessment/Plan  1. Intractable headache, unspecified chronicity pattern, unspecified headache type  Patient continues with headache, though improved.  Family history of brain aneurysm and for family members.  We will get MRI of head.  Will notify patient of results.  - MR-MRA HEAD-W/O; Future    2. Family history of brain aneurysm  As in #1  - MR-MRA HEAD-W/O; Future    3. Essential hypertension, benign  Improved control, however headache still present.  Will increase lisinopril to 20 mg daily.  Continue with metoprolol, no changes.  Patient will continue to monitor blood pressure.  If she becomes lightheaded, she will reduce dose to 10 mg daily.  Reviewed lifestyle measures.  - lisinopril (PRINIVIL) 20 MG Tab; Take 1 Tablet by mouth every day.  Dispense: 90 Tablet; Refill: 1  - Comp Metabolic Panel; Future  - ESTIMATED GFR; Future    4. Mixed hyperlipidemia  Uncontrolled.  Will increase simvastatin dose.  Continue to monitor.  If not improved, consider changing medications.  Reviewed lifestyle measures.  - simvastatin (ZOCOR) 40 MG Tab; Take 1 Tablet by mouth every evening.  Dispense: 90 Tablet; Refill: 1  - Lipid Profile; Future    5. Need for hepatitis C screening test  Rationale reviewed with patient.  - HCV Scrn ( [id number] 1xLife); Future    6. Encounter for screening mammogram for breast cancer  Ordered.  - MA-SCREENING MAMMO BILAT W/CAD; Future    Patient will return to clinic in 4 weeks for screening Pap smear in 6 months for lab review and follow-up on  hypertension and hyperlipidemia.

## 2021-10-26 NOTE — ASSESSMENT & PLAN NOTE
This is a chronic health problem that is uncontrolled with current medications and lifestyle measures.  Taking simvastatin 20 mg daily.  Working on lifestyle measures.  Component      Latest Ref Rng & Units 11/16/2020 10/19/2021          11:53 AM 11:51 AM   Cholesterol,Tot      100 - 199 mg/dL 217 (H) 205 (H)   Triglycerides      0 - 149 mg/dL 222 (H) 137   HDL      >=40 mg/dL 54 54   LDL      <100 mg/dL 119 (H) 124 (H)

## 2021-10-26 NOTE — PATIENT INSTRUCTIONS
Increased dose of lisinopril    Increased dose of simvastatin    Get MRI of brain    Labs in 6 months before appt    Pap appt next month

## 2021-11-03 DIAGNOSIS — I10 ESSENTIAL HYPERTENSION, BENIGN: ICD-10-CM

## 2021-11-03 DIAGNOSIS — R51.9 FREQUENT HEADACHES: ICD-10-CM

## 2021-11-09 RX ORDER — METOPROLOL SUCCINATE 100 MG/1
TABLET, EXTENDED RELEASE ORAL
Qty: 180 TABLET | Refills: 0 | Status: SHIPPED | OUTPATIENT
Start: 2021-11-09 | End: 2021-11-18 | Stop reason: SDUPTHER

## 2021-11-18 DIAGNOSIS — I10 ESSENTIAL HYPERTENSION, BENIGN: ICD-10-CM

## 2021-11-18 DIAGNOSIS — R51.9 FREQUENT HEADACHES: ICD-10-CM

## 2021-11-18 RX ORDER — METOPROLOL SUCCINATE 100 MG/1
200 TABLET, EXTENDED RELEASE ORAL DAILY
Qty: 180 TABLET | Refills: 1 | Status: SHIPPED | OUTPATIENT
Start: 2021-11-18 | End: 2021-11-23 | Stop reason: SDUPTHER

## 2021-11-18 NOTE — TELEPHONE ENCOUNTER
Received request via: Pharmacy    Was the patient seen in the last year in this department? Yes  LOV 10/26/2021    Does the patient have an active prescription (recently filled or refills available) for medication(s) requested? Yes

## 2021-11-23 ENCOUNTER — OFFICE VISIT (OUTPATIENT)
Dept: MEDICAL GROUP | Facility: PHYSICIAN GROUP | Age: 56
End: 2021-11-23
Payer: COMMERCIAL

## 2021-11-23 ENCOUNTER — HOSPITAL ENCOUNTER (OUTPATIENT)
Facility: MEDICAL CENTER | Age: 56
End: 2021-11-23
Attending: NURSE PRACTITIONER
Payer: COMMERCIAL

## 2021-11-23 VITALS
HEART RATE: 119 BPM | RESPIRATION RATE: 16 BRPM | BODY MASS INDEX: 33.29 KG/M2 | OXYGEN SATURATION: 97 % | WEIGHT: 195 LBS | TEMPERATURE: 97.8 F | HEIGHT: 64 IN | DIASTOLIC BLOOD PRESSURE: 80 MMHG | SYSTOLIC BLOOD PRESSURE: 132 MMHG

## 2021-11-23 DIAGNOSIS — I10 ESSENTIAL HYPERTENSION, BENIGN: ICD-10-CM

## 2021-11-23 DIAGNOSIS — Z12.4 CERVICAL CANCER SCREENING: ICD-10-CM

## 2021-11-23 DIAGNOSIS — R51.9 FREQUENT HEADACHES: ICD-10-CM

## 2021-11-23 PROCEDURE — 99396 PREV VISIT EST AGE 40-64: CPT | Performed by: NURSE PRACTITIONER

## 2021-11-23 PROCEDURE — 87624 HPV HI-RISK TYP POOLED RSLT: CPT

## 2021-11-23 PROCEDURE — 88175 CYTOPATH C/V AUTO FLUID REDO: CPT

## 2021-11-23 RX ORDER — METOPROLOL SUCCINATE 200 MG/1
200 TABLET, EXTENDED RELEASE ORAL DAILY
Qty: 90 TABLET | Refills: 3 | Status: SHIPPED | OUTPATIENT
Start: 2021-11-23 | End: 2023-01-31

## 2021-11-23 ASSESSMENT — FIBROSIS 4 INDEX: FIB4 SCORE: 0.91

## 2021-11-23 NOTE — PROGRESS NOTES
SUBJECTIVE: 56 y.o. female for annual routine gynecologic exam  Chief Complaint   Patient presents with   • Gynecologic Exam   • Medication Refill       OB History    Para Term  AB Living   3 2 2 0 1 0   SAB IAB Ectopic Molar Multiple Live Births   1 0 0 0 0 0      Last Pap: 20 years ago  Social History     Substance and Sexual Activity   Sexual Activity Yes   • Partners: Male   • Birth control/protection: Post-Menopausal     H/O Abnormal Pap no  She  reports that she has been smoking cigarettes. She has a 19.00 pack-year smoking history. She has never used smokeless tobacco.        Allergies: Benadryl allergy     ROS:    LMP while in her 30s after right side salpingo-oophorectomy.  Reports mild menopause symptoms of vaginal dryness, easily controlled with K-Y jelly.  Denies hot flashes, night sweats, sleep disruption, mood changes.  No significant bloating/fluid retention, pelvic pain, or dyspareunia. No vaginal discharge.  Not sexually active.  No breast tenderness, mass, nipple discharge, changes in size or contour, or abnormal cyclic discomfort.  No urinary tract symptoms, no incontinence.   No abdominal pain, change in bowel habits, black or bloody stools.    No unusual headaches, no visual changes, menstrual migraines   No prolonged cough. No dyspnea or chest pain on exertion.  No depression, labile mood, anxiety ,libido changes, insomnia.      Exercise: sporadic irregular exercise  Preventive Care:  Health Maintenance Topics with due status: Overdue       Topic Date Due    HEPATITIS C SCREENING Never done    PAP SMEAR Never done    MAMMOGRAM 10/04/2014    IMM ZOSTER VACCINES Never done       Current medicines (including changes today)  Current Outpatient Medications   Medication Sig Dispense Refill   • metoprolol SR (TOPROL XL) 100 MG TABLET SR 24 HR Take 2 Tablets by mouth every day. 180 Tablet 1   • lisinopril (PRINIVIL) 20 MG Tab Take 1 Tablet by mouth every day. 90 Tablet 1   • simvastatin  (ZOCOR) 40 MG Tab Take 1 Tablet by mouth every evening. 90 Tablet 1   • HYDROCHLOROTHIAZIDE PO Take  by mouth.     • escitalopram (LEXAPRO) 10 MG Tab Take 1 tablet by mouth once daily 90 tablet 3   • albuterol 108 (90 Base) MCG/ACT Aero Soln inhalation aerosol Inhale 2 Puffs by mouth every four hours as needed. 1 Inhaler 0   • Cholecalciferol (VITAMIN D3) 1000 units Cap Take  by mouth.     • albuterol 108 (90 BASE) MCG/ACT Aero Soln inhalation aerosol Inhale 2 Puffs by mouth every four hours as needed for Shortness of Breath. 1 Inhaler 1   • aspirin EC (ECOTRIN) 81 MG Tablet Delayed Response Take 81 mg by mouth every day.       No current facility-administered medications for this visit.     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  She  has a past surgical history that includes tonsillectomy; salpingectomy (1999); and tympanomastoidectomy (Left).     Family History:   Family History   Problem Relation Age of Onset   • Arterial Aneurysm Mother         brain   • Hypertension Father    • Heart Disease Father    • Arterial Aneurysm Maternal Uncle         brain   • Arterial Aneurysm Maternal Grandmother         brain   • Arterial Aneurysm Maternal Uncle         brain       Family History   Problem Relation Age of Onset   • Arterial Aneurysm Mother         brain   • Hypertension Father    • Heart Disease Father    • Arterial Aneurysm Maternal Uncle         brain   • Arterial Aneurysm Maternal Grandmother         brain   • Arterial Aneurysm Maternal Uncle         brain          OBJECTIVE:   /80 (BP Location: Left arm, Patient Position: Sitting, BP Cuff Size: Adult)   Pulse (!) 119   Temp 36.6 °C (97.8 °F)  "(Temporal)   Resp 16   Ht 1.626 m (5' 4\")   Wt 88.5 kg (195 lb)   SpO2 97%   BMI 33.47 kg/m²   Body mass index is 33.47 kg/m².    HEAD AND NECK: Normocephalic.  Neck supple.  No carotid bruits. No thyromegaly.   CHEST:  Clear, good air entry, no wheezes or rales. HEART:  Regular rate and rhythm.  Mildly tachycardic (nervous).  No edema or JVD. ABDOMEN:  Soft without tenderness, guarding, mass or organomegaly.  EXTREMITIES:   SKIN: color normal, vascularity normal, no edema, temperature normal   No rashes or suspicious skin lesions noted.     Breast Exam: Patient declined  Pelvic Exam -  Normal external genitalia with no lesions. Normal vaginal mucosa with atrophic rugation and scant discharge. Cervix with no visible lesions. No cervical motion tenderness. Uterus is normal sized with no masses. No adnexal tenderness or enlargement appreciated. Sure Path Pap obtained, and specimen(s) sent to lab  A chaperone was offered to the patient during today's exam. Patient declined chaperone.      <ASSESSMENT and PLAN>  1. Cervical cancer screening  No abnormalities on exam today.  Will notify patient of Pap results.  If normal, will repeat in 5 years.  - THINPREP PAP WITH HPV; Future    2. Essential hypertension, benign  Patient has been without metoprolol for over a month as her insurance is no longer covering the 100 mg tablets.  - metoprolol SR (TOPROL-XL) 200 MG XL tablet; Take 1 Tablet by mouth every day.  Dispense: 90 Tablet; Refill: 3    3. Frequent headaches    - metoprolol SR (TOPROL-XL) 200 MG XL tablet; Take 1 Tablet by mouth every day.  Dispense: 90 Tablet; Refill: 3  Discussed:      "

## 2021-11-24 DIAGNOSIS — Z12.4 CERVICAL CANCER SCREENING: ICD-10-CM

## 2021-12-01 LAB
CYTOLOGY REG CYTOL: NORMAL
HPV HR 12 DNA CVX QL NAA+PROBE: NEGATIVE
HPV16 DNA SPEC QL NAA+PROBE: NEGATIVE
HPV18 DNA SPEC QL NAA+PROBE: NEGATIVE
SPECIMEN SOURCE: NORMAL

## 2022-05-17 ENCOUNTER — HOSPITAL ENCOUNTER (OUTPATIENT)
Dept: LAB | Facility: MEDICAL CENTER | Age: 57
End: 2022-05-17
Attending: NURSE PRACTITIONER
Payer: COMMERCIAL

## 2022-05-17 DIAGNOSIS — I10 ESSENTIAL HYPERTENSION, BENIGN: ICD-10-CM

## 2022-05-17 DIAGNOSIS — Z11.59 NEED FOR HEPATITIS C SCREENING TEST: ICD-10-CM

## 2022-05-17 DIAGNOSIS — E78.2 MIXED HYPERLIPIDEMIA: ICD-10-CM

## 2022-05-17 LAB
ALBUMIN SERPL BCP-MCNC: 4.5 G/DL (ref 3.2–4.9)
ALBUMIN/GLOB SERPL: 2.1 G/DL
ALP SERPL-CCNC: 78 U/L (ref 30–99)
ALT SERPL-CCNC: 30 U/L (ref 2–50)
ANION GAP SERPL CALC-SCNC: 8 MMOL/L (ref 7–16)
AST SERPL-CCNC: 24 U/L (ref 12–45)
BILIRUB SERPL-MCNC: 0.5 MG/DL (ref 0.1–1.5)
BUN SERPL-MCNC: 12 MG/DL (ref 8–22)
CALCIUM SERPL-MCNC: 9.3 MG/DL (ref 8.5–10.5)
CHLORIDE SERPL-SCNC: 106 MMOL/L (ref 96–112)
CHOLEST SERPL-MCNC: 187 MG/DL (ref 100–199)
CO2 SERPL-SCNC: 26 MMOL/L (ref 20–33)
CREAT SERPL-MCNC: 0.92 MG/DL (ref 0.5–1.4)
FASTING STATUS PATIENT QL REPORTED: NORMAL
GFR SERPLBLD CREATININE-BSD FMLA CKD-EPI: 72 ML/MIN/1.73 M 2
GLOBULIN SER CALC-MCNC: 2.1 G/DL (ref 1.9–3.5)
GLUCOSE SERPL-MCNC: 91 MG/DL (ref 65–99)
HCV AB SER QL: NORMAL
HDLC SERPL-MCNC: 58 MG/DL
LDLC SERPL CALC-MCNC: 105 MG/DL
POTASSIUM SERPL-SCNC: 4.4 MMOL/L (ref 3.6–5.5)
PROT SERPL-MCNC: 6.6 G/DL (ref 6–8.2)
SODIUM SERPL-SCNC: 140 MMOL/L (ref 135–145)
TRIGL SERPL-MCNC: 118 MG/DL (ref 0–149)

## 2022-05-17 PROCEDURE — 80053 COMPREHEN METABOLIC PANEL: CPT

## 2022-05-17 PROCEDURE — 36415 COLL VENOUS BLD VENIPUNCTURE: CPT

## 2022-05-17 PROCEDURE — 80061 LIPID PANEL: CPT

## 2022-05-17 PROCEDURE — G0472 HEP C SCREEN HIGH RISK/OTHER: HCPCS

## 2022-05-24 ENCOUNTER — HOSPITAL ENCOUNTER (OUTPATIENT)
Facility: MEDICAL CENTER | Age: 57
End: 2022-05-24
Attending: NURSE PRACTITIONER
Payer: COMMERCIAL

## 2022-05-24 ENCOUNTER — OFFICE VISIT (OUTPATIENT)
Dept: MEDICAL GROUP | Facility: PHYSICIAN GROUP | Age: 57
End: 2022-05-24
Payer: COMMERCIAL

## 2022-05-24 VITALS
HEART RATE: 67 BPM | HEIGHT: 64 IN | DIASTOLIC BLOOD PRESSURE: 68 MMHG | TEMPERATURE: 97.1 F | WEIGHT: 193.3 LBS | RESPIRATION RATE: 18 BRPM | OXYGEN SATURATION: 96 % | BODY MASS INDEX: 33 KG/M2 | SYSTOLIC BLOOD PRESSURE: 122 MMHG

## 2022-05-24 DIAGNOSIS — H65.191 OTHER NON-RECURRENT ACUTE NONSUPPURATIVE OTITIS MEDIA OF RIGHT EAR: ICD-10-CM

## 2022-05-24 DIAGNOSIS — F32.1 CURRENT MODERATE EPISODE OF MAJOR DEPRESSIVE DISORDER WITHOUT PRIOR EPISODE (HCC): ICD-10-CM

## 2022-05-24 DIAGNOSIS — I10 ESSENTIAL HYPERTENSION, BENIGN: ICD-10-CM

## 2022-05-24 DIAGNOSIS — B34.9 VIRAL ILLNESS: ICD-10-CM

## 2022-05-24 DIAGNOSIS — Z76.89 ENCOUNTER TO ESTABLISH CARE: ICD-10-CM

## 2022-05-24 DIAGNOSIS — Z13.29 SCREENING FOR THYROID DISORDER: ICD-10-CM

## 2022-05-24 DIAGNOSIS — E78.2 MIXED HYPERLIPIDEMIA: ICD-10-CM

## 2022-05-24 PROBLEM — H66.90 OTITIS MEDIA: Status: ACTIVE | Noted: 2022-05-24

## 2022-05-24 PROBLEM — J22 LOWER RESPIRATORY INFECTION: Status: RESOLVED | Noted: 2021-02-10 | Resolved: 2022-05-24

## 2022-05-24 PROCEDURE — U0003 INFECTIOUS AGENT DETECTION BY NUCLEIC ACID (DNA OR RNA); SEVERE ACUTE RESPIRATORY SYNDROME CORONAVIRUS 2 (SARS-COV-2) (CORONAVIRUS DISEASE [COVID-19]), AMPLIFIED PROBE TECHNIQUE, MAKING USE OF HIGH THROUGHPUT TECHNOLOGIES AS DESCRIBED BY CMS-2020-01-R: HCPCS

## 2022-05-24 PROCEDURE — U0005 INFEC AGEN DETEC AMPLI PROBE: HCPCS

## 2022-05-24 PROCEDURE — 99214 OFFICE O/P EST MOD 30 MIN: CPT | Mod: CS | Performed by: NURSE PRACTITIONER

## 2022-05-24 RX ORDER — AMOXICILLIN AND CLAVULANATE POTASSIUM 875; 125 MG/1; MG/1
1 TABLET, FILM COATED ORAL 2 TIMES DAILY
Qty: 14 TABLET | Refills: 0 | Status: SHIPPED | OUTPATIENT
Start: 2022-05-24 | End: 2022-05-31

## 2022-05-24 ASSESSMENT — FIBROSIS 4 INDEX: FIB4 SCORE: 1.02

## 2022-05-24 ASSESSMENT — PATIENT HEALTH QUESTIONNAIRE - PHQ9
CLINICAL INTERPRETATION OF PHQ2 SCORE: 6
5. POOR APPETITE OR OVEREATING: 1 - SEVERAL DAYS
SUM OF ALL RESPONSES TO PHQ QUESTIONS 1-9: 12

## 2022-05-24 NOTE — ASSESSMENT & PLAN NOTE
Positive depression screening on exam today.  Incidental finding.  Denies SI/HI.  Taking escitalopram 10 mg daily.

## 2022-05-24 NOTE — ASSESSMENT & PLAN NOTE
This is a chronic health problem that is well controlled with current medications and lifestyle measures.  Taking metoprolol and lisinopril. The patient denies chest pain, shortness of breath, headache, vision changes, epistaxis, or dyspnea on exertion.

## 2022-05-24 NOTE — ASSESSMENT & PLAN NOTE
Patient reports 3-day onset of sore throat sinus congestion, headache, malaise.  Having some chills and diaphoresis.  Her granddaughter was sick last week, tested negative for COVID.  Patient has not tested herself for COVID.  Has been managing with ibuprofen, DayQuil, Emily-[place].

## 2022-05-24 NOTE — PROGRESS NOTES
CC: Lab review, feeling under the weather    HISTORY OF THE PRESENT ILLNESS: Patient is a 56 y.o. female. This pleasant patient is here today, accompanied by , for evaluation and management of the following health problems.        Major depression, single episode  Positive depression screening on exam today.  Incidental finding.  Denies SI/HI.  Taking escitalopram 10 mg daily.    Otitis media  Patient reports 3-day onset of sore throat sinus congestion, headache, malaise.  Having some chills and diaphoresis.  Her granddaughter was sick last week, tested negative for COVID.  Patient has not tested herself for COVID.  Has been managing with ibuprofen, DayQuil, Emily-[place].    Essential hypertension, benign  This is a chronic health problem that is well controlled with current medications and lifestyle measures.  Taking metoprolol and lisinopril. The patient denies chest pain, shortness of breath, headache, vision changes, epistaxis, or dyspnea on exertion.      Mixed hyperlipidemia  Chronic Problem, improved control.  Increase simvastatin to 40 mg 6 months ago.    Component      Latest Ref Rng & Units 10/19/2021 5/17/2022          11:51 AM  8:41 AM   Cholesterol,Tot      100 - 199 mg/dL 205 (H) 187   Triglycerides      0 - 149 mg/dL 137 118   HDL      >=40 mg/dL 54 58   LDL      <100 mg/dL 124 (H) 105 (H)       Allergies: Benadryl allergy    Current Outpatient Medications Ordered in Epic   Medication Sig Dispense Refill   • amoxicillin-clavulanate (AUGMENTIN) 875-125 MG Tab Take 1 Tablet by mouth 2 times a day. 14 Tablet 0   • metoprolol SR (TOPROL-XL) 200 MG XL tablet Take 1 Tablet by mouth every day. 90 Tablet 3   • lisinopril (PRINIVIL) 20 MG Tab Take 1 Tablet by mouth every day. 90 Tablet 1   • simvastatin (ZOCOR) 40 MG Tab Take 1 Tablet by mouth every evening. 90 Tablet 1   • HYDROCHLOROTHIAZIDE PO Take  by mouth.     • escitalopram (LEXAPRO) 10 MG Tab Take 1 tablet by mouth once daily 90 tablet 3   •  "albuterol 108 (90 Base) MCG/ACT Aero Soln inhalation aerosol Inhale 2 Puffs by mouth every four hours as needed. 1 Inhaler 0   • Cholecalciferol (VITAMIN D3) 1000 units Cap Take  by mouth.     • albuterol 108 (90 BASE) MCG/ACT Aero Soln inhalation aerosol Inhale 2 Puffs by mouth every four hours as needed for Shortness of Breath. 1 Inhaler 1   • aspirin EC (ECOTRIN) 81 MG Tablet Delayed Response Take 81 mg by mouth every day.       No current [facility]-ordered facility-administered medications on file.       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   • TONSILLECTOMY     • TYMPANOMASTOIDECTOMY Left        Social History     Tobacco Use   • Smoking status: Current Every Day Smoker     Packs/day: 0.50     Years: 38.00     Pack years: 19.00     Types: Cigarettes   • Smokeless tobacco: Never Used   Vaping Use   • Vaping Use: Some days   Substance Use Topics   • Alcohol use: No   • Drug use: No       Family History   Problem Relation Age of Onset   • Arterial Aneurysm Mother         brain   • Hypertension Father    • Heart Disease Father    • Arterial Aneurysm Maternal Uncle         brain   • Arterial Aneurysm Maternal Grandmother         brain   • Arterial Aneurysm Maternal Uncle         brain       ROS:   As in HPI, otherwise negative for chest pain, dyspnea, abdominal pain, dysuria, blood in stool, fever         Exam: /68 (BP Location: Right arm, Patient Position: Sitting, BP Cuff Size: Adult)   Pulse 67   Temp 36.2 °C (97.1 °F) (Temporal)   Resp 18   Ht 1.626 m (5' 4\")   Wt " 87.7 kg (193 lb 4.8 oz)   SpO2 96%  Body mass index is 33.18 kg/m².    General: Alert, pleasant, well nourished, well developed female in NAD  HEENT: Normocephalic. Eyes conjunctiva clear lids without ptosis, pupils equal and reactive to light, ears normal shape and contour, canals are clear bilaterally, right tympanic membrane with erythema and effusion, left tympanic membrane is pearly gray with good light reflex, nasal mucosa pink and edematous, oropharynx is without erythema, edema or exudates.   Neck: Supple without bruit. Thyroid is not enlarged.  Pulmonary: Clear to ausculation.  Normal effort. No rales, ronchi, or wheezing.  Cardiovascular: Normal rate and rhythm without murmur. Carotid and radial pulses are intact and equal bilaterally.  No lower extremity edema.  Abdomen: Soft, nontender, nondistended. Normal bowel sounds. Liver and spleen are not palpable  Neurologic: Grossly nonfocal  Lymph: No cervical or supraclavicular lymph nodes are palpable  Skin: Warm and dry.    Musculoskeletal: Normal gait.   Psych: Normal mood and affect. Alert and oriented. Judgment and insight is normal.    Component      Latest Ref Rng & Units 5/17/2022   Sodium      135 - 145 mmol/L 140   Potassium      3.6 - 5.5 mmol/L 4.4   Chloride      96 - 112 mmol/L 106   Co2      20 - 33 mmol/L 26   Anion Gap      7.0 - 16.0 8.0   Glucose      65 - 99 mg/dL 91   Bun      8 - 22 mg/dL 12   Creatinine      0.50 - 1.40 mg/dL 0.92   Calcium      8.5 - 10.5 mg/dL 9.3   AST(SGOT)      12 - 45 U/L 24   ALT(SGPT)      2 - 50 U/L 30   Alkaline Phosphatase      30 - 99 U/L 78   Total Bilirubin      0.1 - 1.5 mg/dL 0.5   Albumin      3.2 - 4.9 g/dL 4.5   Total Protein      6.0 - 8.2 g/dL 6.6   Globulin      1.9 - 3.5 g/dL 2.1   A-G Ratio      g/dL 2.1   Hepatitis C Antibody      Non-Reactive Non-Reactive   GFR (CKD-EPI)      >60 mL/min/1.73 m 2 72       Please note that this dictation was created using voice recognition software. I have made  every reasonable attempt to correct obvious errors, but I expect that there are errors of grammar and possibly content that I did not discover before finalizing the note.      Assessment/Plan  1. Encounter to establish care  Last Pap showed scant cellularity.  Will refer to gynecology for Pap smear.  Not having any issues.  - Referral to Gynecology    2. Other non-recurrent acute nonsuppurative otitis media of right ear  Upon exam today, patient does have right otitis media.  Will prescribe Augmentin as she also may have sinus infection.  Instructions side effects reviewed with patient.  - amoxicillin-clavulanate (AUGMENTIN) 875-125 MG Tab; Take 1 Tablet by mouth 2 times a day.  Dispense: 14 Tablet; Refill: 0    3. Viral illness    - SARS-CoV-2 PCR (24 hour In-House): Collect NP swab in VTM; Future    4. Essential hypertension, benign  Well-controlled.  Continue with lisinopril and metoprolol.  Lifestyle measures reviewed with patient.  - Comp Metabolic Panel; Future  - ESTIMATED GFR; Future    5. Screening for thyroid disorder    - TSH WITH REFLEX TO FT4; Future    6. Current moderate episode of major depressive disorder without prior episode (HCC)  Positive depression screening at the end of exam today.  Patient will return to clinic in 1 to 2 weeks for appointment so we can focus on depression.  In the meantime, continue with escitalopram.    7. Mixed hyperlipidemia  Well-controlled.  Continue with simvastatin at current dose.  Reviewed lifestyle measures.    Patient will return to clinic in 1 week or sooner if needed.  She will also schedule appointment for 6 months now for lab review.

## 2022-05-24 NOTE — ASSESSMENT & PLAN NOTE
Chronic Problem, improved control.  Increase simvastatin to 40 mg 6 months ago.    Component      Latest Ref Rng & Units 10/19/2021 5/17/2022          11:51 AM  8:41 AM   Cholesterol,Tot      100 - 199 mg/dL 205 (H) 187   Triglycerides      0 - 149 mg/dL 137 118   HDL      >=40 mg/dL 54 58   LDL      <100 mg/dL 124 (H) 105 (H)

## 2022-05-25 LAB
COVID ORDER STATUS COVID19: NORMAL
SARS-COV-2 RNA RESP QL NAA+PROBE: DETECTED
SPECIMEN SOURCE: ABNORMAL

## 2022-05-31 ENCOUNTER — OFFICE VISIT (OUTPATIENT)
Dept: MEDICAL GROUP | Facility: PHYSICIAN GROUP | Age: 57
End: 2022-05-31
Payer: COMMERCIAL

## 2022-05-31 ENCOUNTER — HOSPITAL ENCOUNTER (OUTPATIENT)
Facility: MEDICAL CENTER | Age: 57
End: 2022-05-31
Attending: NURSE PRACTITIONER
Payer: COMMERCIAL

## 2022-05-31 ENCOUNTER — HOSPITAL ENCOUNTER (OUTPATIENT)
Dept: LAB | Facility: MEDICAL CENTER | Age: 57
End: 2022-05-31
Attending: NURSE PRACTITIONER
Payer: COMMERCIAL

## 2022-05-31 VITALS
WEIGHT: 193.1 LBS | DIASTOLIC BLOOD PRESSURE: 80 MMHG | HEIGHT: 64 IN | HEART RATE: 71 BPM | RESPIRATION RATE: 20 BRPM | SYSTOLIC BLOOD PRESSURE: 122 MMHG | TEMPERATURE: 97.7 F | BODY MASS INDEX: 32.97 KG/M2 | OXYGEN SATURATION: 98 %

## 2022-05-31 DIAGNOSIS — R10.9 FLANK PAIN: ICD-10-CM

## 2022-05-31 DIAGNOSIS — U07.1 COVID-19: ICD-10-CM

## 2022-05-31 DIAGNOSIS — K92.1 BLOOD IN STOOL: ICD-10-CM

## 2022-05-31 DIAGNOSIS — F32.1 CURRENT MODERATE EPISODE OF MAJOR DEPRESSIVE DISORDER WITHOUT PRIOR EPISODE (HCC): ICD-10-CM

## 2022-05-31 DIAGNOSIS — J98.8 WHEEZING-ASSOCIATED RESPIRATORY INFECTION (WARI): ICD-10-CM

## 2022-05-31 DIAGNOSIS — F32.1 MODERATE SINGLE CURRENT EPISODE OF MAJOR DEPRESSIVE DISORDER (HCC): ICD-10-CM

## 2022-05-31 DIAGNOSIS — K52.9 POSTPRANDIAL DIARRHEA: ICD-10-CM

## 2022-05-31 DIAGNOSIS — R10.11 RIGHT UPPER QUADRANT ABDOMINAL PAIN: ICD-10-CM

## 2022-05-31 LAB
ERYTHROCYTE [DISTWIDTH] IN BLOOD BY AUTOMATED COUNT: 43.8 FL (ref 35.9–50)
HCT VFR BLD AUTO: 44.1 % (ref 37–47)
HGB BLD-MCNC: 14.9 G/DL (ref 12–16)
MCH RBC QN AUTO: 31.2 PG (ref 27–33)
MCHC RBC AUTO-ENTMCNC: 33.8 G/DL (ref 33.6–35)
MCV RBC AUTO: 92.5 FL (ref 81.4–97.8)
PLATELET # BLD AUTO: 250 K/UL (ref 164–446)
PMV BLD AUTO: 10.5 FL (ref 9–12.9)
RBC # BLD AUTO: 4.77 M/UL (ref 4.2–5.4)
WBC # BLD AUTO: 5.5 K/UL (ref 4.8–10.8)

## 2022-05-31 PROCEDURE — 85027 COMPLETE CBC AUTOMATED: CPT

## 2022-05-31 PROCEDURE — 99214 OFFICE O/P EST MOD 30 MIN: CPT | Performed by: NURSE PRACTITIONER

## 2022-05-31 PROCEDURE — 36415 COLL VENOUS BLD VENIPUNCTURE: CPT

## 2022-05-31 PROCEDURE — 87086 URINE CULTURE/COLONY COUNT: CPT

## 2022-05-31 PROCEDURE — 81001 URINALYSIS AUTO W/SCOPE: CPT

## 2022-05-31 RX ORDER — ESCITALOPRAM OXALATE 20 MG/1
20 TABLET ORAL DAILY
Qty: 90 TABLET | Refills: 0 | Status: SHIPPED | OUTPATIENT
Start: 2022-05-31 | End: 2022-09-27

## 2022-05-31 RX ORDER — ALBUTEROL SULFATE 90 UG/1
2 AEROSOL, METERED RESPIRATORY (INHALATION) EVERY 4 HOURS PRN
Qty: 1 EACH | Refills: 0 | Status: SHIPPED | OUTPATIENT
Start: 2022-05-31 | End: 2023-07-21 | Stop reason: SDUPTHER

## 2022-05-31 RX ORDER — SULFAMETHOXAZOLE AND TRIMETHOPRIM 800; 160 MG/1; MG/1
1 TABLET ORAL 2 TIMES DAILY
Qty: 10 TABLET | Refills: 0 | Status: SHIPPED | OUTPATIENT
Start: 2022-05-31 | End: 2023-07-21

## 2022-05-31 ASSESSMENT — FIBROSIS 4 INDEX: FIB4 SCORE: 1.02

## 2022-05-31 ASSESSMENT — PATIENT HEALTH QUESTIONNAIRE - PHQ9
SUM OF ALL RESPONSES TO PHQ QUESTIONS 1-9: 9
5. POOR APPETITE OR OVEREATING: 1 - SEVERAL DAYS
CLINICAL INTERPRETATION OF PHQ2 SCORE: 2

## 2022-05-31 NOTE — ASSESSMENT & PLAN NOTE
Patient is on day 10 of COVID-19 illness.  She reports she is still feeling fatigued.  Also dealing with new right flank pain.  Please see additional notes.  Does have mild cough and using albuterol inhaler as needed, but ran out.

## 2022-05-31 NOTE — ASSESSMENT & PLAN NOTE
This is a chronic health problem that is uncontrolled with current medications and lifestyle measures.  Reports has been feeling down and hopeless and unmotivated often for over 6 months.  Denies SI/HI.  Taking escitalopram 10 mg daily.

## 2022-05-31 NOTE — ASSESSMENT & PLAN NOTE
Patient reports 3-day onset of right flank pain that radiates to right upper quadrant.  Reports had these symptoms when she last had a kidney infection.  Denies dysuria, hematuria, fever.  Heat helps a little.  Aggravated by taking a deep breath.    Quality of pain is dull, then sharp.  Has had intermittent episodes of flank pain for the last few months that is usually resolved with hydration, Advil, heat.  Denies history of kidney stones.

## 2022-05-31 NOTE — PROGRESS NOTES
CC: Depression, flank pain    HISTORY OF THE PRESENT ILLNESS: Patient is a 56 y.o. female. This pleasant patient is here today, accompanied by , for evaluation and management of the following health problems.      Major depression, single episode  This is a chronic health problem that is uncontrolled with current medications and lifestyle measures.  Reports has been feeling down and hopeless and unmotivated often for over 6 months.  Denies SI/HI.  Taking escitalopram 10 mg daily.    Blood in stool  Patient reports a single episode couple weeks ago of kim blood with bowel movement.  No stool, just a large amount of blood.  Denies hemorrhoid or abdominal pain at this time.  Had a lot of pressure prior to defecating blood.  Resolved after that, so did not think anything of it.  Denies night sweats, unintentional weight loss.  Colonoscopy 8 years ago normal.  Also reports many years of postprandial diarrhea.  Has not had it worked up.    Flank pain  Patient reports 3-day onset of right flank pain that radiates to right upper quadrant.  Reports had these symptoms when she last had a kidney infection.  Denies dysuria, hematuria, fever.  Heat helps a little.  Aggravated by taking a deep breath.    Quality of pain is dull, then sharp.  Has had intermittent episodes of flank pain for the last few months that is usually resolved with hydration, Advil, heat.  Denies history of kidney stones.    COVID-19  Patient is on day 10 of COVID-19 illness.  She reports she is still feeling fatigued.  Also dealing with new right flank pain.  Please see additional notes.  Does have mild cough and using albuterol inhaler as needed, but ran out.      Allergies: Benadryl allergy    Current Outpatient Medications Ordered in Epic   Medication Sig Dispense Refill   • Cyanocobalamin (VITAMIN B-12 PO) Take  by mouth. 2500 mcg     • sulfamethoxazole-trimethoprim (BACTRIM DS) 800-160 MG tablet Take 1 Tablet by mouth 2 times a day. 10 Tablet  0   • albuterol 108 (90 Base) MCG/ACT Aero Soln inhalation aerosol Inhale 2 Puffs every four hours as needed (wheezing). 1 Each 0   • escitalopram (LEXAPRO) 20 MG tablet Take 1 Tablet by mouth every day. 90 Tablet 0   • metoprolol SR (TOPROL-XL) 200 MG XL tablet Take 1 Tablet by mouth every day. 90 Tablet 3   • lisinopril (PRINIVIL) 20 MG Tab Take 1 Tablet by mouth every day. 90 Tablet 1   • simvastatin (ZOCOR) 40 MG Tab Take 1 Tablet by mouth every evening. 90 Tablet 1   • HYDROCHLOROTHIAZIDE PO Take  by mouth.     • Cholecalciferol (VITAMIN D3) 1000 units Cap Take  by mouth.     • aspirin EC (ECOTRIN) 81 MG Tablet Delayed Response Take 81 mg by mouth every day.       No current [facility]-ordered facility-administered medications on file.       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   • TONSILLECTOMY     • TYMPANOMASTOIDECTOMY Left        Social History     Tobacco Use   • Smoking status: Current Every Day Smoker     Packs/day: 0.50     Years: 38.00     Pack years: 19.00     Types: Cigarettes   • Smokeless tobacco: Never Used   Vaping Use   • Vaping Use: Some days   Substance Use Topics   • Alcohol use: No   • Drug use: No       Family History   Problem Relation Age of Onset   • Arterial Aneurysm Mother         brain   • Hypertension Father    • Heart Disease Father    • Arterial Aneurysm Maternal Uncle         brain   • Arterial Aneurysm Maternal Grandmother         brain   • Arterial Aneurysm Maternal Uncle         brain       ROS:  As in HPI, otherwise negative for chest pain,  "dyspnea, abdominal pain, dysuria, blood in stool, fever         Exam: /80   Pulse 71   Temp 36.5 °C (97.7 °F) (Temporal)   Resp 20   Ht 1.626 m (5' 4\")   Wt 87.6 kg (193 lb 1.6 oz)   SpO2 98%  Body mass index is 33.15 kg/m².    General: Alert, pleasant, well nourished, well developed female in NAD  HEENT: Normocephalic. Eyes conjunctiva clear lids without ptosis, pupils equal and reactive to light, ears normal shape and contour, canals are clear bilaterally, tympanic membranes are pearly gray with good light reflex, nasal mucosa without erythema and drainage, oropharynx is without erythema, edema or exudates.   Neck: Supple without bruit. Thyroid is not enlarged.  Pulmonary: Clear to ausculation.  Normal effort. No rales, ronchi, or wheezing.  Cardiovascular: Normal rate and rhythm without murmur.  No lower extremity edema.  Abdomen: Soft, mild tenderness right upper quadrant, nondistended. Normal bowel sounds. Liver and spleen are not palpable  Positive mild CVA tenderness, right side  Neurologic: Grossly nonfocal  Lymph: No cervical or supraclavicular lymph nodes are palpable  Skin: Warm and dry.   Low back: no erythema or edema, nontender to palpation, patellar DTR's 1+ and equal bilaterally  Musculoskeletal: Antalgic gait.   Psych: Normal mood and affect. Alert and oriented. Judgment and insight is normal.    Please note that this dictation was created using voice recognition software. I have made every reasonable attempt to correct obvious errors, but I expect that there are errors of grammar and possibly content that I did not discover before finalizing the note.      Assessment/Plan  1. Blood in stool  Will refer to gastroenterology for further evaluation.  We will get updated CBC.  - Referral to Gastroenterology  - CBC WITHOUT DIFFERENTIAL; Future    2. Postprandial diarrhea  Referring patient to gastroenterology for blood in stool.  We will also refer for postprandial diarrhea.  - Referral to " Gastroenterology    3. Right upper quadrant abdominal pain  Would like to get CT of kidneys to rule out kidney stone or other renal condition.  Patient plans on getting imaging done at [place].  She will message me when she has gotten this done so we can request records.  - CT-RENAL COLIC EVALUATION(A/P W/O); Future    4. Wheezing-associated respiratory infection (WARI)  Continue with albuterol as needed.  - albuterol 108 (90 Base) MCG/ACT Aero Soln inhalation aerosol; Inhale 2 Puffs every four hours as needed (wheezing).  Dispense: 1 Each; Refill: 0    5. Flank pain  Will treat empirically as urinary tract infection with Bactrim.  Patient did just finished Augmentin for sinus infection.  Advised on probiotic.  We will get urine culture.  - URINE CULTURE(NEW); Future  - URINALYSIS; Future    6. Moderate single current episode of major depressive disorder (HCC)  Uncontrolled.  Will increase escitalopram to 20 mg daily.  - escitalopram (LEXAPRO) 20 MG tablet; Take 1 Tablet by mouth every day.  Dispense: 90 Tablet; Refill: 0    7. Current moderate episode of major depressive disorder without prior episode (HCC)  As a #6    8. COVID-19  Doing well, some residual fatigue and wheezing.  Refill for albuterol sent to pharmacy.      Patient will return to clinic in 6 weeks for follow-up on depression or sooner if needed.

## 2022-06-01 LAB
APPEARANCE UR: CLEAR
BACTERIA #/AREA URNS HPF: NEGATIVE /HPF
BILIRUB UR QL STRIP.AUTO: NEGATIVE
COLOR UR: YELLOW
EPI CELLS #/AREA URNS HPF: NORMAL /HPF
GLUCOSE UR STRIP.AUTO-MCNC: NEGATIVE MG/DL
HYALINE CASTS #/AREA URNS LPF: NORMAL /LPF
KETONES UR STRIP.AUTO-MCNC: NEGATIVE MG/DL
LEUKOCYTE ESTERASE UR QL STRIP.AUTO: NEGATIVE
MICRO URNS: ABNORMAL
NITRITE UR QL STRIP.AUTO: NEGATIVE
PH UR STRIP.AUTO: 7.5 [PH] (ref 5–8)
PROT UR QL STRIP: 30 MG/DL
RBC # URNS HPF: NORMAL /HPF
RBC UR QL AUTO: NEGATIVE
SP GR UR STRIP.AUTO: 1.01
UROBILINOGEN UR STRIP.AUTO-MCNC: 0.2 MG/DL
WBC #/AREA URNS HPF: NORMAL /HPF

## 2022-06-03 LAB
BACTERIA UR CULT: NORMAL
SIGNIFICANT IND 70042: NORMAL
SITE SITE: NORMAL
SOURCE SOURCE: NORMAL

## 2022-06-16 DIAGNOSIS — E78.2 MIXED HYPERLIPIDEMIA: ICD-10-CM

## 2022-06-16 DIAGNOSIS — I10 ESSENTIAL HYPERTENSION, BENIGN: ICD-10-CM

## 2022-06-16 RX ORDER — LISINOPRIL 20 MG/1
TABLET ORAL
Qty: 90 TABLET | Refills: 3 | Status: SHIPPED | OUTPATIENT
Start: 2022-06-16 | End: 2023-07-21 | Stop reason: SDUPTHER

## 2022-06-16 RX ORDER — SIMVASTATIN 40 MG
TABLET ORAL
Qty: 90 TABLET | Refills: 3 | Status: SHIPPED | OUTPATIENT
Start: 2022-06-16 | End: 2023-07-21 | Stop reason: SDUPTHER

## 2022-06-16 NOTE — TELEPHONE ENCOUNTER
Received request via: Pharmacy    Was the patient seen in the last year in this department? Yes, 5/31/22    Does the patient have an active prescription (recently filled or refills available) for medication(s) requested? No

## 2022-09-25 DIAGNOSIS — F32.1 MODERATE SINGLE CURRENT EPISODE OF MAJOR DEPRESSIVE DISORDER (HCC): ICD-10-CM

## 2022-09-27 DIAGNOSIS — F32.1 MODERATE SINGLE CURRENT EPISODE OF MAJOR DEPRESSIVE DISORDER (HCC): ICD-10-CM

## 2022-09-27 RX ORDER — ESCITALOPRAM OXALATE 20 MG/1
20 TABLET ORAL DAILY
Qty: 90 TABLET | Refills: 0 | OUTPATIENT
Start: 2022-09-27

## 2022-09-27 RX ORDER — ESCITALOPRAM OXALATE 20 MG/1
TABLET ORAL
Qty: 90 TABLET | Refills: 1 | Status: SHIPPED | OUTPATIENT
Start: 2022-09-27 | End: 2023-04-18

## 2022-09-27 NOTE — TELEPHONE ENCOUNTER
Received request via: Pharmacy    Was the patient seen in the last year in this department? Yes    Does the patient have an active prescription (recently filled or refills available) for medication(s) requested? No    Last OV 5/31/2022  Next OV 11/29/2022

## 2022-09-27 NOTE — TELEPHONE ENCOUNTER
Received request via: Patient    Was the patient seen in the last year in this department? Yes    Does the patient have an active prescription (recently filled or refills available) for medication(s) requested? No    Last Office Visit:05/31/2022  Last Labs:05/31/2022    Next appt: 11/29/2022

## 2023-01-17 ENCOUNTER — APPOINTMENT (OUTPATIENT)
Dept: MEDICAL GROUP | Facility: PHYSICIAN GROUP | Age: 58
End: 2023-01-17
Payer: COMMERCIAL

## 2023-01-30 DIAGNOSIS — R51.9 FREQUENT HEADACHES: ICD-10-CM

## 2023-01-30 DIAGNOSIS — I10 ESSENTIAL HYPERTENSION, BENIGN: ICD-10-CM

## 2023-01-31 RX ORDER — METOPROLOL SUCCINATE 200 MG/1
TABLET, EXTENDED RELEASE ORAL
Qty: 90 TABLET | Refills: 1 | Status: SHIPPED | OUTPATIENT
Start: 2023-01-31 | End: 2023-04-19 | Stop reason: SDUPTHER

## 2023-01-31 NOTE — TELEPHONE ENCOUNTER
Last ov 5/31/22    Received request via: Pharmacy    Was the patient seen in the last year in this department? Yes    Does the patient have an active prescription (recently filled or refills available) for medication(s) requested? No    Does the patient have detention Plus and need 100 day supply (blood pressure, diabetes and cholesterol meds only)? Patient does not have SCP

## 2023-04-17 DIAGNOSIS — F32.1 MODERATE SINGLE CURRENT EPISODE OF MAJOR DEPRESSIVE DISORDER (HCC): ICD-10-CM

## 2023-04-18 RX ORDER — ESCITALOPRAM OXALATE 20 MG/1
TABLET ORAL
Qty: 90 TABLET | Refills: 0 | Status: SHIPPED | OUTPATIENT
Start: 2023-04-18 | End: 2023-07-21 | Stop reason: SDUPTHER

## 2023-04-18 NOTE — TELEPHONE ENCOUNTER
Last ov 5/31/22    Received request via: Pharmacy    Was the patient seen in the last year in this department? Yes    Does the patient have an active prescription (recently filled or refills available) for medication(s) requested? No    Does the patient have assisted Plus and need 100 day supply (blood pressure, diabetes and cholesterol meds only)? Patient does not have SCP

## 2023-04-19 DIAGNOSIS — I10 ESSENTIAL HYPERTENSION, BENIGN: ICD-10-CM

## 2023-04-19 DIAGNOSIS — R51.9 FREQUENT HEADACHES: ICD-10-CM

## 2023-04-19 RX ORDER — METOPROLOL SUCCINATE 200 MG/1
200 TABLET, EXTENDED RELEASE ORAL DAILY
Qty: 90 TABLET | Refills: 1 | Status: SHIPPED | OUTPATIENT
Start: 2023-04-19 | End: 2023-07-21 | Stop reason: SDUPTHER

## 2023-05-09 ENCOUNTER — OFFICE VISIT (OUTPATIENT)
Dept: URGENT CARE | Facility: PHYSICIAN GROUP | Age: 58
End: 2023-05-09
Payer: COMMERCIAL

## 2023-05-09 VITALS
OXYGEN SATURATION: 96 % | SYSTOLIC BLOOD PRESSURE: 120 MMHG | TEMPERATURE: 97.9 F | WEIGHT: 190 LBS | RESPIRATION RATE: 20 BRPM | BODY MASS INDEX: 32.44 KG/M2 | HEIGHT: 64 IN | HEART RATE: 60 BPM | DIASTOLIC BLOOD PRESSURE: 68 MMHG

## 2023-05-09 DIAGNOSIS — J02.9 SORE THROAT: ICD-10-CM

## 2023-05-09 LAB — S PYO DNA SPEC NAA+PROBE: NOT DETECTED

## 2023-05-09 PROCEDURE — 87651 STREP A DNA AMP PROBE: CPT | Performed by: FAMILY MEDICINE

## 2023-05-09 PROCEDURE — 99213 OFFICE O/P EST LOW 20 MIN: CPT | Performed by: FAMILY MEDICINE

## 2023-05-09 ASSESSMENT — FIBROSIS 4 INDEX: FIB4 SCORE: 1.02

## 2023-05-09 NOTE — PROGRESS NOTES
"  Subjective:      57 y.o. female presents to urgent care for sore throat that started on Sunday.  No associated headache, body ache, fever, cough, or diarrhea.  She denies any tobacco product use.  No history of asthma or COPD.  She is fully vaccinated against COVID.  She was recently exposed to strep throat.    She denies any other questions or concerns at this time.    Current problem list, medication, and past medical/surgical history were reviewed in Epic.    ROS  See HPI     Objective:      /68   Pulse 60   Temp 36.6 °C (97.9 °F) (Temporal)   Resp 20   Ht 1.626 m (5' 4\")   Wt 86.2 kg (190 lb)   SpO2 96%   BMI 32.61 kg/m²     Physical Exam  Constitutional:       General: She is not in acute distress.     Appearance: She is not diaphoretic.   HENT:      Right Ear: Tympanic membrane, ear canal and external ear normal.      Left Ear: Tympanic membrane, ear canal and external ear normal.      Mouth/Throat:      Tongue: Tongue does not deviate from midline.      Palate: No lesions.      Pharynx: Uvula midline. Posterior oropharyngeal erythema present.      Tonsils: No tonsillar exudate. 0 on the right. 0 on the left.   Cardiovascular:      Rate and Rhythm: Normal rate and regular rhythm.      Heart sounds: Normal heart sounds.   Pulmonary:      Effort: Pulmonary effort is normal. No respiratory distress.      Breath sounds: Normal breath sounds.   Neurological:      Mental Status: She is alert.   Psychiatric:         Mood and Affect: Affect normal.         Judgment: Judgment normal.     Assessment/Plan:     1. Sore throat  Negative strep.  Symptoms most consistent with virus.  Tylenol, ibuprofen, gargle warm salt water as needed for symptomatic relief.  - POCT GROUP A STREP, PCR      Instructed to return to Urgent Care or nearest Emergency Department if symptoms fail to improve, for any change in condition, further concerns, or new concerning symptoms. Patient states understanding of the plan of care " and discharge instructions.    Gail Corona M.D.

## 2023-07-21 ENCOUNTER — OFFICE VISIT (OUTPATIENT)
Dept: MEDICAL GROUP | Facility: PHYSICIAN GROUP | Age: 58
End: 2023-07-21
Payer: COMMERCIAL

## 2023-07-21 VITALS
RESPIRATION RATE: 16 BRPM | BODY MASS INDEX: 32.93 KG/M2 | WEIGHT: 192.9 LBS | SYSTOLIC BLOOD PRESSURE: 130 MMHG | OXYGEN SATURATION: 97 % | HEART RATE: 58 BPM | HEIGHT: 64 IN | TEMPERATURE: 97.4 F | DIASTOLIC BLOOD PRESSURE: 80 MMHG

## 2023-07-21 DIAGNOSIS — E66.9 OBESITY (BMI 30-39.9): ICD-10-CM

## 2023-07-21 DIAGNOSIS — I10 ESSENTIAL HYPERTENSION, BENIGN: ICD-10-CM

## 2023-07-21 DIAGNOSIS — F17.211 CIGARETTE NICOTINE DEPENDENCE IN REMISSION: ICD-10-CM

## 2023-07-21 DIAGNOSIS — Z12.31 ENCOUNTER FOR SCREENING MAMMOGRAM FOR BREAST CANCER: ICD-10-CM

## 2023-07-21 DIAGNOSIS — E78.2 MIXED HYPERLIPIDEMIA: ICD-10-CM

## 2023-07-21 DIAGNOSIS — R51.9 FREQUENT HEADACHES: ICD-10-CM

## 2023-07-21 DIAGNOSIS — J98.8 WHEEZING-ASSOCIATED RESPIRATORY INFECTION (WARI): ICD-10-CM

## 2023-07-21 DIAGNOSIS — F32.1 CURRENT MODERATE EPISODE OF MAJOR DEPRESSIVE DISORDER WITHOUT PRIOR EPISODE (HCC): ICD-10-CM

## 2023-07-21 PROBLEM — F17.210 CIGARETTE NICOTINE DEPENDENCE WITHOUT COMPLICATION: Status: RESOLVED | Noted: 2021-09-16 | Resolved: 2023-07-21

## 2023-07-21 PROCEDURE — 3075F SYST BP GE 130 - 139MM HG: CPT | Performed by: NURSE PRACTITIONER

## 2023-07-21 PROCEDURE — 99214 OFFICE O/P EST MOD 30 MIN: CPT | Performed by: NURSE PRACTITIONER

## 2023-07-21 PROCEDURE — 3079F DIAST BP 80-89 MM HG: CPT | Performed by: NURSE PRACTITIONER

## 2023-07-21 RX ORDER — LISINOPRIL 20 MG/1
20 TABLET ORAL DAILY
Qty: 90 TABLET | Refills: 3 | Status: SHIPPED | OUTPATIENT
Start: 2023-07-21

## 2023-07-21 RX ORDER — ESCITALOPRAM OXALATE 20 MG/1
20 TABLET ORAL DAILY
Qty: 90 TABLET | Refills: 3 | Status: SHIPPED | OUTPATIENT
Start: 2023-07-21

## 2023-07-21 RX ORDER — ALBUTEROL SULFATE 90 UG/1
2 AEROSOL, METERED RESPIRATORY (INHALATION) EVERY 4 HOURS PRN
Qty: 1 EACH | Refills: 0 | Status: SHIPPED | OUTPATIENT
Start: 2023-07-21 | End: 2023-08-22

## 2023-07-21 RX ORDER — SIMVASTATIN 40 MG
40 TABLET ORAL EVERY EVENING
Qty: 90 TABLET | Refills: 3 | Status: SHIPPED | OUTPATIENT
Start: 2023-07-21

## 2023-07-21 RX ORDER — METOPROLOL SUCCINATE 200 MG/1
200 TABLET, EXTENDED RELEASE ORAL DAILY
Qty: 90 TABLET | Refills: 3 | Status: SHIPPED | OUTPATIENT
Start: 2023-07-21

## 2023-07-21 ASSESSMENT — PATIENT HEALTH QUESTIONNAIRE - PHQ9
8. MOVING OR SPEAKING SO SLOWLY THAT OTHER PEOPLE COULD HAVE NOTICED. OR THE OPPOSITE, BEING SO FIGETY OR RESTLESS THAT YOU HAVE BEEN MOVING AROUND A LOT MORE THAN USUAL: NOT AT ALL
4. FEELING TIRED OR HAVING LITTLE ENERGY: NOT AT ALL
2. FEELING DOWN, DEPRESSED, IRRITABLE, OR HOPELESS: NOT AT ALL
6. FEELING BAD ABOUT YOURSELF - OR THAT YOU ARE A FAILURE OR HAVE LET YOURSELF OR YOUR FAMILY DOWN: NOT AL ALL
1. LITTLE INTEREST OR PLEASURE IN DOING THINGS: NOT AT ALL
3. TROUBLE FALLING OR STAYING ASLEEP OR SLEEPING TOO MUCH: NOT AT ALL
7. TROUBLE CONCENTRATING ON THINGS, SUCH AS READING THE NEWSPAPER OR WATCHING TELEVISION: NOT AT ALL
SUM OF ALL RESPONSES TO PHQ9 QUESTIONS 1 AND 2: 0
9. THOUGHTS THAT YOU WOULD BE BETTER OFF DEAD, OR OF HURTING YOURSELF: NOT AT ALL
SUM OF ALL RESPONSES TO PHQ QUESTIONS 1-9: 0
5. POOR APPETITE OR OVEREATING: NOT AT ALL

## 2023-07-21 ASSESSMENT — FIBROSIS 4 INDEX: FIB4 SCORE: 1.02

## 2023-07-21 NOTE — ASSESSMENT & PLAN NOTE
This is a chronic health problem that is well controlled with current medications and lifestyle measures.  Taking lisinopril 20 mg daily and metoprolol  mg daily.  Exercising by power walking.  The patient denies chest pain, shortness of breath, headache, vision changes, epistaxis, or dyspnea on exertion.

## 2023-07-21 NOTE — PROGRESS NOTES
CC: Follow-up chronic health problems, medication refills    HISTORY OF THE PRESENT ILLNESS: Patient is a 57 y.o. female. This pleasant patient is here today for evaluation and management of the following health problems.    Health Maintenance: due      Cigarette nicotine dependence in remission  Patient stopped smoking 6 months ago.  Feeling great!    Essential hypertension, benign  This is a chronic health problem that is well controlled with current medications and lifestyle measures.  Taking lisinopril 20 mg daily and metoprolol  mg daily.  Exercising by power walking.  The patient denies chest pain, shortness of breath, headache, vision changes, epistaxis, or dyspnea on exertion.      Mixed hyperlipidemia  Chronic health problem.  Taking simvastatin 40 mg daily.  Due for updated lipid profile.    Current moderate episode of major depressive disorder without prior episode (HCC)  This is a chronic health problem that is well controlled with current medications and lifestyle measures.  Taking escitalopram 20 mg daily.  Increased dose a year ago has been helping a great deal.  Continues to work full-time at [employer].  His caregiver for her .  Tries to get out with friends every once in a while.  Denies SI/HI.    Allergies: Benadryl allergy    Current Outpatient Medications Ordered in Epic   Medication Sig Dispense Refill    escitalopram (LEXAPRO) 20 MG tablet Take 1 Tablet by mouth every day. 90 Tablet 3    lisinopril (PRINIVIL) 20 MG Tab Take 1 Tablet by mouth every day. 90 Tablet 3    metoprolol (TOPROL XL) 200 MG XL tablet Take 1 Tablet by mouth every day. 90 Tablet 3    simvastatin (ZOCOR) 40 MG Tab Take 1 Tablet by mouth every evening. 90 Tablet 3    albuterol 108 (90 Base) MCG/ACT Aero Soln inhalation aerosol Inhale 2 Puffs every four hours as needed (wheezing). 1 Each 0    Cyanocobalamin (VITAMIN B-12 PO) Take  by mouth. 2500 mcg      HYDROCHLOROTHIAZIDE PO Take  by mouth.      Cholecalciferol  "(VITAMIN D3) 1000 units Cap Take  by mouth.      aspirin EC (ECOTRIN) 81 MG Tablet Delayed Response Take 81 mg by mouth every day.       No current [facility]-ordered facility-administered medications on file.       Past Medical History:   Diagnosis Date    [facility] 2011    Bone Joaquin 2011    Elevated BP 10/5/2009    Essential hypertension, benign 2013    Major depression, single episode 10/4/2013    Mixed hyperlipidemia 10/5/09    uncontrolled    Premature menopause 10/5/2009    Tobacco use disorder 2010    Unspecified vitamin D deficiency 2011       Past Surgical History:   Procedure Laterality Date    SALPINGECTOMY      right removed due to a tumor    TONSILLECTOMY      TYMPANOMASTOIDECTOMY Left        Social History     Tobacco Use    Smoking status: Former     Packs/day: 0.50     Years: 38.00     Pack years: 19.00     Types: Cigarettes     Quit date: 2023     Years since quittin.3    Smokeless tobacco: Never   Vaping Use    Vaping Use: Some days   Substance Use Topics    Alcohol use: No    Drug use: No       Family History   Problem Relation Age of Onset    Arterial Aneurysm Mother         brain    Hypertension Father     Heart Disease Father     Arterial Aneurysm Maternal Uncle         brain    Arterial Aneurysm Maternal Grandmother         brain    Arterial Aneurysm Maternal Uncle         brain       ROS:   As in HPI, otherwise negative for chest pain, dyspnea, abdominal pain, dysuria, blood in stool, fever           Exam: /80   Pulse (!) 58   Temp 36.3 °C (97.4 °F) (Temporal)   Resp 16   Ht 1.626 m (5' 4\")   Wt 87.5 kg (192 lb 14.4 oz)   SpO2 97%  Body mass index is 33.11 kg/m².    General: Alert, pleasant, well nourished, well developed female in NAD  HEENT: Normocephalic. Eyes conjunctiva clear lids without ptosis, pupils equal and reactive to light, ears normal shape and contour, canals are clear bilaterally, tympanic membranes are pearly gray with good light " reflex, nasal mucosa without erythema and drainage, oropharynx is without erythema, edema or exudates.   Neck: Supple without bruit. Thyroid is not enlarged.  Pulmonary: Clear to ausculation.  Normal effort. No rales, ronchi, or wheezing.  Cardiovascular: Normal rate and rhythm without murmur. Carotid and radial pulses are intact and equal bilaterally.  No lower extremity edema.  Abdomen: Soft, nontender, nondistended. Normal bowel sounds. Liver and spleen are not palpable  Neurologic: Grossly nonfocal  Lymph: No cervical or supraclavicular lymph nodes are palpable  Skin: Warm and dry.    Musculoskeletal: Normal gait.   Psych: Normal mood and affect. Alert and oriented. Judgment and insight is normal.    Please note that this dictation was created using voice recognition software. I have made every reasonable attempt to correct obvious errors, but I expect that there are errors of grammar and possibly content that I did not discover before finalizing the note.      Assessment/Plan  1. Current moderate episode of major depressive disorder without prior episode (HCC)  Well-controlled.  Continue with current dose of escitalopram.  Advised on routine aerobic exercise as tolerated.  - escitalopram (LEXAPRO) 20 MG tablet; Take 1 Tablet by mouth every day.  Dispense: 90 Tablet; Refill: 3    2. Essential hypertension, benign  Well-controlled.  Continue with current dose of metoprolol and lisinopril.  Advised on routine aerobic exercise as tolerated.  - Comp Metabolic Panel; Future  - ESTIMATED GFR; Future  - lisinopril (PRINIVIL) 20 MG Tab; Take 1 Tablet by mouth every day.  Dispense: 90 Tablet; Refill: 3  - metoprolol (TOPROL XL) 200 MG XL tablet; Take 1 Tablet by mouth every day.  Dispense: 90 Tablet; Refill: 3    3. Frequent headaches    - metoprolol (TOPROL XL) 200 MG XL tablet; Take 1 Tablet by mouth every day.  Dispense: 90 Tablet; Refill: 3    4. Mixed hyperlipidemia  Due for updated lipid profile.  In the meantime,  continue with current dose of simvastatin.  - Lipid Profile; Future  - simvastatin (ZOCOR) 40 MG Tab; Take 1 Tablet by mouth every evening.  Dispense: 90 Tablet; Refill: 3    5. Wheezing-associated respiratory infection (WARI)    - albuterol 108 (90 Base) MCG/ACT Aero Soln inhalation aerosol; Inhale 2 Puffs every four hours as needed (wheezing).  Dispense: 1 Each; Refill: 0    6. Encounter for screening mammogram for breast cancer  Ordered  - MA-SCREENING MAMMO BILAT W/TOMOSYNTHESIS W/CAD; Future    7. Obesity (BMI 30-39.9)    - Patient identified as having weight management issue.  Appropriate orders and counseling given.    8. Cigarette nicotine dependence in remission  Congratulated patient on her smoking cessation!

## 2023-07-21 NOTE — ASSESSMENT & PLAN NOTE
This is a chronic health problem that is well controlled with current medications and lifestyle measures.  Taking escitalopram 20 mg daily.  Increased dose a year ago has been helping a great deal.  Continues to work full-time at [employer].  His caregiver for her .  Tries to get out with friends every once in a while.  Denies SI/HI.

## 2023-08-15 DIAGNOSIS — J98.8 WHEEZING-ASSOCIATED RESPIRATORY INFECTION (WARI): ICD-10-CM

## 2023-08-22 RX ORDER — ALBUTEROL SULFATE 90 UG/1
2 AEROSOL, METERED RESPIRATORY (INHALATION) EVERY 4 HOURS PRN
Qty: 8.5 EACH | Refills: 4 | Status: SHIPPED | OUTPATIENT
Start: 2023-08-22

## 2023-08-22 NOTE — TELEPHONE ENCOUNTER
Last ov 7/21/23    Received request via: Pharmacy    Was the patient seen in the last year in this department? Yes    Does the patient have an active prescription (recently filled or refills available) for medication(s) requested? No    Does the patient have FDC Plus and need 100 day supply (blood pressure, diabetes and cholesterol meds only)? Patient does not have SCP

## 2023-10-13 ENCOUNTER — OFFICE VISIT (OUTPATIENT)
Dept: URGENT CARE | Facility: PHYSICIAN GROUP | Age: 58
End: 2023-10-13
Payer: COMMERCIAL

## 2023-10-13 VITALS
RESPIRATION RATE: 16 BRPM | DIASTOLIC BLOOD PRESSURE: 80 MMHG | OXYGEN SATURATION: 96 % | SYSTOLIC BLOOD PRESSURE: 128 MMHG | TEMPERATURE: 97.4 F | HEIGHT: 63 IN | WEIGHT: 194 LBS | HEART RATE: 72 BPM | BODY MASS INDEX: 34.38 KG/M2

## 2023-10-13 DIAGNOSIS — U07.1 COVID: ICD-10-CM

## 2023-10-13 DIAGNOSIS — Z87.891 FORMER SMOKER: ICD-10-CM

## 2023-10-13 DIAGNOSIS — J02.9 PHARYNGITIS, UNSPECIFIED ETIOLOGY: ICD-10-CM

## 2023-10-13 DIAGNOSIS — R50.9 FEVER, UNSPECIFIED FEVER CAUSE: ICD-10-CM

## 2023-10-13 LAB
FLUAV RNA SPEC QL NAA+PROBE: NEGATIVE
FLUBV RNA SPEC QL NAA+PROBE: NEGATIVE
RSV RNA SPEC QL NAA+PROBE: NEGATIVE
S PYO DNA SPEC NAA+PROBE: NOT DETECTED
SARS-COV-2 RNA RESP QL NAA+PROBE: POSITIVE

## 2023-10-13 PROCEDURE — 0241U POCT CEPHEID COV-2, FLU A/B, RSV - PCR: CPT

## 2023-10-13 PROCEDURE — 3074F SYST BP LT 130 MM HG: CPT

## 2023-10-13 PROCEDURE — 3079F DIAST BP 80-89 MM HG: CPT

## 2023-10-13 PROCEDURE — 99214 OFFICE O/P EST MOD 30 MIN: CPT

## 2023-10-13 PROCEDURE — 87651 STREP A DNA AMP PROBE: CPT

## 2023-10-13 RX ORDER — AMOXICILLIN AND CLAVULANATE POTASSIUM 875; 125 MG/1; MG/1
1 TABLET, FILM COATED ORAL 2 TIMES DAILY
Qty: 14 TABLET | Refills: 0 | Status: SHIPPED | OUTPATIENT
Start: 2023-10-13 | End: 2023-10-14

## 2023-10-13 RX ORDER — METHYLPREDNISOLONE 4 MG/1
TABLET ORAL
Qty: 21 TABLET | Refills: 0 | Status: SHIPPED | OUTPATIENT
Start: 2023-10-13

## 2023-10-13 ASSESSMENT — ENCOUNTER SYMPTOMS
FEVER: 1
SINUS PAIN: 0
DIAPHORESIS: 0
SHORTNESS OF BREATH: 1
STRIDOR: 0
WHEEZING: 0
HEMOPTYSIS: 0
WEIGHT LOSS: 0
CHILLS: 1
HEADACHES: 0
SORE THROAT: 1
COUGH: 0
SPUTUM PRODUCTION: 0

## 2023-10-13 ASSESSMENT — FIBROSIS 4 INDEX: FIB4 SCORE: 1.03

## 2023-10-13 NOTE — LETTER
October 13, 2023    To Whom It May Concern:         This is confirmation that Bryan Kearney attended her scheduled appointment with LYDIA Palmer on 10/13/23. Please excuse her from 10/13/23 and 10/14/23 due to an acute illness.         If you have any questions please do not hesitate to call me at the phone number listed below.    Sincerely,          GRACIELA PalmerRNandoN.  [phone]

## 2023-10-14 NOTE — PROGRESS NOTES
"Subjective:   Bryan Kearney is a very pleasant 58 y.o. female who presents for:    Chief Complaint   Patient presents with    Fever     X6 days fever, sore throat, congestion, some cough, body aches, chills, fatigue, Joint pain ,exposed to CV at work, would like to get tested for Strep and CV        HPI:    Fever   This is a new problem. Episode onset: three days. The maximum temperature noted was 101 to 101.9 F (this morning was the last fever. She feels that it is \"coming and going\"). Associated symptoms include congestion, muscle aches and a sore throat. Pertinent negatives include no coughing, headaches or wheezing. Associated symptoms comments: Intermittent SOB, fatigue. She has tried fluids (Advil PM, benadryl, rest, inhaler PRN) for the symptoms.   Risk factors comment:  Former smoker with 45 pack years      ROS:    Review of Systems   Constitutional:  Positive for chills, fever and malaise/fatigue. Negative for diaphoresis and weight loss.   HENT:  Positive for congestion and sore throat. Negative for nosebleeds and sinus pain.    Respiratory:  Positive for shortness of breath. Negative for cough, hemoptysis, sputum production, wheezing and stridor.    Neurological:  Negative for headaches.   All other systems reviewed and are negative.      Medications:      Current Outpatient Medications   Medication Sig    albuterol 108 (90 Base) MCG/ACT Aero Soln inhalation aerosol INHALE 2 PUFFS EVERY FOUR HOURS AS NEEDED (WHEEZING).    escitalopram (LEXAPRO) 20 MG tablet Take 1 Tablet by mouth every day.    lisinopril (PRINIVIL) 20 MG Tab Take 1 Tablet by mouth every day.    metoprolol (TOPROL XL) 200 MG XL tablet Take 1 Tablet by mouth every day.    simvastatin (ZOCOR) 40 MG Tab Take 1 Tablet by mouth every evening.    Cyanocobalamin (VITAMIN B-12 PO) Take  by mouth. 2500 mcg    HYDROCHLOROTHIAZIDE PO Take  by mouth.    Cholecalciferol (VITAMIN D3) 1000 units Cap Take  by mouth.    aspirin EC (ECOTRIN) 81 MG Tablet " Delayed Response Take 81 mg by mouth every day.       Allergies:     Allergies   Allergen Reactions    Benadryl Allergy        Problem List:     Patient Active Problem List   Diagnosis    Mixed hyperlipidemia    Cigarette nicotine dependence in remission    Vitamin D deficiency    Essential hypertension, benign    Current moderate episode of major depressive disorder without prior episode (HCC)    Health care maintenance    Obesity (BMI 30-39.9)    Postmenopausal    Vascular insufficiency    Hemorrhoids    Enlarged thyroid    History of tympanomastoidectomy    Otitis media    Blood in stool    Flank pain    COVID-19       Surgical History:    Past Surgical History:   Procedure Laterality Date    SALPINGECTOMY      right removed due to a tumor    TONSILLECTOMY      TYMPANOMASTOIDECTOMY Left        Past Social Hx:     Social History     Socioeconomic History    Marital status: Single   Tobacco Use    Smoking status: Former     Current packs/day: 0.00     Average packs/day: 0.5 packs/day for 38.0 years (19.0 ttl pk-yrs)     Types: Cigarettes     Start date: 1985     Quit date: 2023     Years since quittin.6    Smokeless tobacco: Never   Vaping Use    Vaping Use: Some days   Substance and Sexual Activity    Alcohol use: No    Drug use: No    Sexual activity: Yes     Partners: Male     Birth control/protection: Post-Menopausal   Other Topics Concern    Exercise No     Comment: does walk a great deal at work        Past Family Hx:      Family History   Problem Relation Age of Onset    Arterial Aneurysm Mother         brain    Hypertension Father     Heart Disease Father     Arterial Aneurysm Maternal Uncle         brain    Arterial Aneurysm Maternal Grandmother         brain    Arterial Aneurysm Maternal Uncle         brain       Problem list, medications, and allergies reviewed by myself today in Epic.     Objective:     Vitals:    10/13/23 1751   BP: 128/80   Pulse: 72   Resp: 16   Temp: 36.3 °C (97.4 °F)    SpO2: 96%       Physical Exam  Vitals reviewed.   Constitutional:       General: She is not in acute distress.     Appearance: Normal appearance. She is not ill-appearing, toxic-appearing or diaphoretic.   HENT:      Head: Normocephalic and atraumatic.      Right Ear: Tympanic membrane, ear canal and external ear normal.      Left Ear: Tympanic membrane, ear canal and external ear normal.      Nose: Nose normal.      Mouth/Throat:      Mouth: Mucous membranes are moist.      Pharynx: Oropharynx is clear.   Eyes:      Extraocular Movements: Extraocular movements intact.      Conjunctiva/sclera: Conjunctivae normal.      Pupils: Pupils are equal, round, and reactive to light.   Cardiovascular:      Rate and Rhythm: Normal rate and regular rhythm.      Pulses: Normal pulses.      Heart sounds: Normal heart sounds.   Pulmonary:      Effort: Pulmonary effort is normal.      Breath sounds: Examination of the left-middle field reveals wheezing. Examination of the right-lower field reveals rales. Examination of the left-lower field reveals wheezing and rales. Wheezing and rales present.   Abdominal:      General: Abdomen is flat.      Palpations: Abdomen is soft.   Musculoskeletal:         General: Normal range of motion.      Cervical back: Normal range of motion.   Skin:     General: Skin is warm and dry.   Neurological:      General: No focal deficit present.      Mental Status: She is alert and oriented to person, place, and time.   Psychiatric:         Mood and Affect: Mood normal.         Behavior: Behavior normal.         Thought Content: Thought content normal.             Results from POCT:   Results for orders placed or performed in visit on 10/13/23   POCT CEPHEID COV-2, FLU A/B, RSV - PCR   Result Value Ref Range    SARS-CoV-2 by PCR Positive (A) Negative, Invalid    Influenza virus A RNA Negative Negative, Invalid    Influenza virus B, PCR Negative Negative, Invalid    RSV, PCR Negative Negative, Invalid    POCT CEPHEID GROUP A STREP - PCR   Result Value Ref Range    POC Group A Strep, PCR Not Detected Not Detected, Invalid       Assessment/Plan:     Diagnosis and associated orders:     1. COVID  - methylPREDNISolone (MEDROL DOSEPAK) 4 MG Tablet Therapy Pack; Follow schedule on package instructions.  Dispense: 21 Tablet; Refill: 0  - molnupiravir 200 MG capsule; Take 4 Capsules by mouth 2 times a day for 5 days.  Dispense: 40 Capsule; Refill: 0    2. Fever, unspecified fever cause  - POCT CEPHEID COV-2, FLU A/B, RSV - PCR    3. Pharyngitis, unspecified etiology  - POCT CEPHEID GROUP A STREP - PCR    4. Former smoker  - methylPREDNISolone (MEDROL DOSEPAK) 4 MG Tablet Therapy Pack; Follow schedule on package instructions.  Dispense: 21 Tablet; Refill: 0          Comments/MDM:     POCT COVID positive  Negative for strep, RSV, influenza  Prescription for molnupiravir sent to patient's preferred pharmacy for the treatment of COVID-19  Due to patient's longstanding history of smoking, prescription for Medrol Dosepak ordered to help decrease lung inflammation  I discussed self isolation and provided printed instructions. Educated patient to follow all CDC guidelines regarding infection prevention.  She will need to quarantine for 5 days after onset of symptoms and wear tight fitting mask from day 6 through 10.  Work note provided  No evidence of lower respiratory involvement on exam today      Recommend symptomatic care:  OTC second generation antihistamine daily (cetirizine, desloratadine, fexofenadine, levocetirizine, and loratadine) daily IN COMBINATION WITH:  OTC decongestant (Sudafed - Pseudoephedrine) unless contraindication in place, such as hypertension, CAD, narrow-angle glaucoma. Use with caution if the patient has a history of cardiac dysrhythmias, hyperthyroidism, DM, prostatic hypertrophy, and glaucoma should use with caution.  Intranasal fluticasone (Flonase) daily  Nasal saline rinses 2-3 times a day   May  use short term nasal sprays, such as oxymetazoline (Afrin) to help relieve nasal discomfort, congestion, and/or pressure. Decongestant sprays should not be used longer than three consecutive days.   Nasal rinsing with saline nasal spray or salt water (e.g., neti pot) can help relieve nasal dryness.  Breathe Right nasal strips at night for nasal congestion  Ponaris nasal emmollient for nasal congestion, dryness, and inflammation (do not use with iodine sensitivity)  Cool mist humidification, chest rubs, warm tea with honey, increased fluid intake to thin secretions  Tylenol or ibuprofen as needed for fever control, body aches, and headaches.    If sore throat is present:   Warm salt water gargles, over-the-counter throat sprays, rest, hydration with frozen (eg, ice or popsicles) or warmed liquids, herbal tea containing licorice root, elm inner bark, marshmallow root, and licorice root aqueous dry extract, Cepacol lozenges, soft diet, honey, vitamin C, zinc lozenges, and elderberry supplements.    Return to clinic or go to the ED if symptoms worsen or fail to improve, or if the patient should develop worsening/increasing sinus pain/pressure, congestion, ear pain, sore throat, headache, cough, shortness of breath, wheezing, chest pain, fever/chills, and/or any concerning symptoms. Patient is in agreement with treatment plan.            All questions answered. Patient verbalized understanding and is in agreement with this plan of care.     If symptoms are worsening or not improving in 3-5 days, follow-up with PCP or return to UC. Differential diagnosis, natural history, and supportive care discussed. AVS handout given and reviewed with patient. Patient educated on red flags and when to seek treatment back in ED or UC.     I personally reviewed prior external notes and test results pertinent to today's visit.  I have independently reviewed and interpreted all diagnostics ordered during this urgent care visit.     This  dictation has been created using voice recognition software. The accuracy of the dictation is limited by the abilities of the software. I expect there may be some errors of grammar and possibly content. I made every attempt to manually correct the errors within my dictation. However, errors related to voice recognition software may still exist and should be interpreted within the appropriate context.    This note was electronically signed by LYDIA Stock

## 2023-10-14 NOTE — PATIENT INSTRUCTIONS
"As we discussed your clinical condition would benefit from over-the-counter remedies:    FLONASE (once per day)  You may consider intranasal steroids such as fluticasone (brand name Flonase), (other options include Nasonex, Rhinocort, Nasacort) daily; continue for at least 2-3 weeks. Any generic should work as well but may cause slightly more nasal irritation. Please take according to package directions.  This steroid will help reduce inflammation of your sinuses.  This is the number one medication to help with seasonal allergies and treat nasal inflammation.  Cost: around $8 at [facility] for the generic fluticasone [facility] product (as of 5/20).    ANTIHISTAMINES  You may take a non-sedating antihistamine like Zyrtec (cetirizine) , Allegra (fexofenadine), Xyzal (levocetirizine), or Claritin (loratadine).  This will help \"dry you out\" and reduce the amount of nasal inflammation.  Follow package directions paying attention to whether they are once or twice daily.  Note: if there is a \"D\" such mitch Allegra-D it also contains a decongestant such as sudafed.  Some patients benefit from alternating these medications every few weeks but literature does not support this.   Cost: around $11 at [facility] for the generic cetirizine [facility] branded product (as of 5/20)    SUDAFED (low dose to see if tolerated)  You may consider over-the-counter Sudafed (pseudoephedrine) to act as a decongestant to improve your breathing through your nose.  Please do not use this medication if you already have known high blood pressure.  Please take according to package directions and note that this has a stimulating effect and should not be taken late in the day.  There is a low dose 12 hour formulation and a higher dose 24 hour formulation.  Start with a low dose to make sure you tolerate the medication.  Do not take late in the day as it may interfere with sleep.   Cost: Around $6 for the generic [facility] branded product at a 10mg dose (as of " "2/2023).      BENZOCAINE DROPS  These contain the active ingredient benzocaine which is an anesthetic agent.  It helps relieve the symptoms of sore throat and may diminish your cough reflex.  Take according to package directions.  The brand name CEPACOL but the generic will suffice.  Please note that taking too frequently may cause harm - pay attention to package directions.  Cost: around $4 at [facility] for Chloroseptic drops (15 count) (as of 2/2023).      SALINE IRRIGATION/SPRAY  You may consider using a saline nasal irrigation (such as a \"Neti pot\" or similar device using sterile water, NOT tap water) or OTC saline nasal spray      NSAIDs  You may take Ibuprofen (brand name Motrin or Advil) may be taken 800 mg up to three times per day taken with food (do not exceed 2400 mg per day).  If you prefer you may consider Naproxen (brand name Naprosyn or Aleve) around 500 mg twice per day with food (do not exceed 1200 mg per day). Please do not take if you have known stomach ulcers or known kidney disease.     TYLENOL  You may take Tylenol according to package directions (1000 mg every 8 hours not to exceed 3000 mg per day).  Please do not consume with any alcohol products, or if you have known or suspected liver disease. These will help reduce inflammation, help with pain control, and can reduce fevers.     "

## 2024-06-13 NOTE — ASSESSMENT & PLAN NOTE
Patient reports a single episode couple weeks ago of kim blood with bowel movement.  No stool, just a large amount of blood.  Denies hemorrhoid or abdominal pain at this time.  Had a lot of pressure prior to defecating blood.  Resolved after that, so did not think anything of it.  Denies night sweats, unintentional weight loss.  Colonoscopy 8 years ago normal.  Also reports many years of postprandial diarrhea.  Has not had it worked up.   Render Risk Assessment In Note?: no Detail Level: Detailed Additional Notes: Lesion of concern noted on intake. Additional Notes: Recommend applying duct tape to affected nails for a month.

## 2025-05-12 ENCOUNTER — OFFICE VISIT (OUTPATIENT)
Dept: URGENT CARE | Facility: PHYSICIAN GROUP | Age: 60
End: 2025-05-12
Payer: COMMERCIAL

## 2025-05-12 VITALS
HEART RATE: 74 BPM | OXYGEN SATURATION: 99 % | RESPIRATION RATE: 16 BRPM | SYSTOLIC BLOOD PRESSURE: 142 MMHG | WEIGHT: 200.1 LBS | TEMPERATURE: 97.1 F | BODY MASS INDEX: 35.45 KG/M2 | DIASTOLIC BLOOD PRESSURE: 98 MMHG

## 2025-05-12 DIAGNOSIS — J22 LOWER RESPIRATORY INFECTION (E.G., BRONCHITIS, PNEUMONIA, PNEUMONITIS, PULMONITIS): ICD-10-CM

## 2025-05-12 PROCEDURE — 3080F DIAST BP >= 90 MM HG: CPT | Performed by: NURSE PRACTITIONER

## 2025-05-12 PROCEDURE — 99213 OFFICE O/P EST LOW 20 MIN: CPT | Performed by: NURSE PRACTITIONER

## 2025-05-12 PROCEDURE — 3077F SYST BP >= 140 MM HG: CPT | Performed by: NURSE PRACTITIONER

## 2025-05-12 RX ORDER — AZITHROMYCIN 250 MG/1
TABLET, FILM COATED ORAL
Qty: 6 TABLET | Refills: 0 | Status: SHIPPED | OUTPATIENT
Start: 2025-05-12

## 2025-05-12 RX ORDER — PREDNISONE 10 MG/1
40 TABLET ORAL DAILY
Qty: 20 TABLET | Refills: 0 | Status: SHIPPED | OUTPATIENT
Start: 2025-05-12 | End: 2025-05-17

## 2025-05-12 RX ORDER — BENZONATATE 100 MG/1
100 CAPSULE ORAL 3 TIMES DAILY PRN
Qty: 30 CAPSULE | Refills: 0 | Status: SHIPPED | OUTPATIENT
Start: 2025-05-12 | End: 2025-05-22

## 2025-05-13 NOTE — PROGRESS NOTES
Subjective:   Bryan Kearney is a 59 y.o. female who presents for Cough (Cough, short of breath, exposed to covid, mucus  X 3 weeks )    Patient is a 59-year-old female presenting clinic today reporting approximately 3 weeks ago she had COVID and since then she continues to have a cough and clear to white phlegm production.  Patient does experience shortness of breath during coughing events.  She denies any shortness of breath during regular activity or on exertion.  She has not had any chest pain, fevers, lightheadedness, or dizziness.  Patient has been taking over-the-counter Mucinex which has helped some with symptoms.  Patient does have chronic history of tobacco dependency.  She does have an albuterol inhaler however has not needed to use it.    Medications, Allergies, and current problem list reviewed today in Epic.     Objective:     BP (!) 142/98   Pulse 74   Temp 36.2 °C (97.1 °F)   Resp 16   Wt 90.8 kg (200 lb 1.6 oz)   SpO2 99%     Physical Exam  Vitals reviewed.   Constitutional:       General: She is not in acute distress.     Appearance: Normal appearance. She is not ill-appearing, toxic-appearing or diaphoretic.   HENT:      Head: Normocephalic.      Right Ear: Tympanic membrane, ear canal and external ear normal.      Left Ear: Tympanic membrane, ear canal and external ear normal.      Nose: Nose normal. No rhinorrhea.      Mouth/Throat:      Mouth: Mucous membranes are moist.   Eyes:      Extraocular Movements: Extraocular movements intact.      Conjunctiva/sclera: Conjunctivae normal.      Pupils: Pupils are equal, round, and reactive to light.   Cardiovascular:      Rate and Rhythm: Normal rate and regular rhythm.   Pulmonary:      Effort: Pulmonary effort is normal. No respiratory distress.      Breath sounds: Normal breath sounds. No stridor. No wheezing, rhonchi or rales.   Musculoskeletal:         General: Normal range of motion.      Cervical back: Normal range of motion and neck  supple. No rigidity or tenderness.   Lymphadenopathy:      Cervical: No cervical adenopathy.   Skin:     General: Skin is warm and dry.   Neurological:      Mental Status: She is alert and oriented to person, place, and time.   Psychiatric:         Mood and Affect: Mood normal.         Behavior: Behavior normal.         Thought Content: Thought content normal.         Judgment: Judgment normal.         Assessment/Plan:     Diagnosis and associated orders:     1. Lower respiratory infection (e.g., bronchitis, pneumonia, pneumonitis, pulmonitis)  predniSONE (DELTASONE) 10 MG Tab    azithromycin (ZITHROMAX) 250 MG Tab    benzonatate (TESSALON) 100 MG Cap         Comments/MDM:     This is an acute condition.  Patient toxic appearing in no acute distress.  Lung sounds are clear on physical exam.  Patient does have mildly elevated blood pressure otherwise all vital signs are within normal range.  Will go ahead and prescribe prednisolone and benzonatate Perle, recommended patient continue on guaifenesin expectorant.  If symptoms do not improve after 5-day dose of prednisone patient may start on azithromycin or may start sooner if symptoms acutely worsen.  Discussed with patient at length that I do feel that this is postviral bronchitis and antibiotics are not warranted at this time.  Education was provided about supportive care measures and the importance of respiratory toileting and staying well-hydrated.  Follow-up in clinic or with primary care as needed.  ER precautions discussed.  Patient was involved with shared decision-making throughout the exam today and verbalizes understanding regards to plan of care, discharge instructions, and follow-up         Differential diagnosis, natural history, supportive care, and indications for immediate follow-up discussed.    Advised the patient to follow-up with the primary care physician for recheck, reevaluation, and consideration of further management.    I personally reviewed  prior external notes and test results pertinent to today's visit as well as additional imaging and testing completed in clinic today.     Please note that this dictation was created using voice recognition software. I have made a reasonable attempt to correct obvious errors, but I expect that there are errors of grammar and possibly content that I did not discover before finalizing the note.